# Patient Record
(demographics unavailable — no encounter records)

---

## 2024-10-09 NOTE — PHYSICAL EXAM
[Restricted in physically strenuous activity but ambulatory and able to carry out work of a light or sedentary nature] : Status 1- Restricted in physically strenuous activity but ambulatory and able to carry out work of a light or sedentary nature, e.g., light house work, office work [Normal] : affect appropriate [de-identified] : No icterus [de-identified] : KENDRA  [de-identified] : Somewhat distant BS [de-identified] : Supple No LAD  [de-identified] : S1 S2 [de-identified] : Mild LE edema [de-identified] : No spine/CVA tenderness [de-identified] : Ambulates with cane

## 2024-10-09 NOTE — RESULTS/DATA
[FreeTextEntry1] : - PET/CT 5/11/2022: FDG avid nodular pleural thickening in the left lung base concerning for metastatic disease.  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  Additional 2-3 mm nodules in the right lung.  Persistent focal hypermetabolic activity in the anus.  - CT chest 8/10/2022: Overall findings are worrisome for worsening metastatic disease in both lungs and pleura.  -CT Chest 11/4/22:  Since 8/10/2022:  Unchanged irregular and nodular interlobular septal thickening in the posterior aspect of the left upper lobe and bilateral sub-5 mm lung nodules (and new compared to 2/20/2022).  Increased size of multiple skeletal sclerotic lesions.  -MRI Brain 11/6/22:  No acute intracranial hemorrhage, acute infarction, extra-axial fluid collection or hydrocephalus.  No enhancing intracranial lesion identified to suggest intracranial metastases.    --CT Chest 2/3/23: Decreased small loculated left pleural effusion with resolution of left pleural gas. Numerous stable sub-4 mm nodules in both lungs. No new or enlarging lung nodule. Increasing sclerosis of numerous bone metastasis.  -CT Chest 4/28/23:  In comparison with 2/3/2023, new nodular groundglass opacities throughout the right lung.  Stable micronodules along the pleural and fissural surfaces.  Left lower lobectomy with stable postsurgical changes. Stable septal thickening throughout the left upper lobe and right lower lobe. Stable left pleural thickening. Stable trace left pleural effusion.  Stable metastatic sclerotic bone lesions.  -CT Chest 6/28/23:  Compared to the previous examination, no significant change is noted.  -CT Chest 9/18/23:  Numerous bilateral 1-2 mm pulmonary nodules are unchanged since June 28, 2023.  Stable osseous metastases.  -CT Chest 12/27/23:  Increased 5 mm right perifissural nodule since September 2023. Other numerous small bilateral pulmonary nodules are unchanged.  Osteosclerotic metastases with increased sclerosis of the left anterior fourth rib.  Stable left pleural thickening and lower lobe septal thickening (?lymphangitic carcinomatosis).   -CT Chest 3/27/24: Previously described increased 5 mm right perifissural nodule has resolved, and may had represented area of atelectasis. No new or enlarging pulmonary nodule. Post left lower lobectomy. Osteosclerotic metastases, unchanged.  -CT Chest 6/18/24:  Interval resolution of previously seen posterior right lower lobe peribronchovascular groundglass opacities, which were likely infectious/inflammatory. Otherwise stable exam status post left lower lobectomy with pleural and septal thickening, numerous bilateral small nodules, and osteosclerotic metastases.

## 2024-10-09 NOTE — BEGINNING OF VISIT
[0] : 2) Feeling down, depressed, or hopeless: Not at all (0) [Pain Scale: ___] : On a scale of 1-10, today the patient's pain is a(n) [unfilled]. [Medication(s)] : Medication(s) [Former] : Former [0-4] : 0-4 [> 15 Years] : > 15 Years [Date Discussed (MM/DD/YY): ___] : Discussed: [unfilled] [With Patient/Caregiver] : with Patient/Caregiver

## 2024-10-09 NOTE — HISTORY OF PRESENT ILLNESS
[Disease: _____________________] : Disease: [unfilled] [AJCC Stage: ____] : AJCC Stage: [unfilled] [de-identified] : 65F with metastatic NSCLC with adenocarcinoma histology with activating EGFR mutation.  In 2019, she was having eye surgery for a possible retinal detachment and a choroid lesion was noted with biopsy revealing malignancy.  She was sent to an ophthalmologist/oncologist (Dr. Kendall) who sent the patient for PET/CT in February 2019 revealing evidence of a left lower lobe lung primary tumor with intrathoracic lymph node involvement and evidence of bony metastatic disease.  Biopsy confirmed NSCLC with adenocarcinoma histology.  Peripheral blood CT-DNA testing revealed an activating EGFR mutation (exon 19 deletion).  She started osimertinib in March 2019.  She had transient left arm pain and swelling after starting the medication with negative Dopplers and a mild elevation of CK.  Medication was temporarily dose reduced to 40 mg before resuming full dose without recurrence of symptoms.  She responded to the medication.  Restaging CT in November 2020 revealed disease progression in the LLL primary tumor and a small left effusion.  She underwent VATS left lower lobectomy in December 2020 with pathology revealing a 2.1 cm adenocarcinoma with negative lymph nodes; molecular testing revealed her known EGFR exon 19 deletion mutation.  She continued on osimertinib.  A restaging PET/CT in May 2022 revealed findings concerning for disease progression, particularly in the left lung.  Follow-up CT chest in August 2022 revealed findings concerning for disease progression in the lungs and pleura.  She transferred care to [facility] in Sept 2022.  Peripheral blood ct-DNA testing from Sept 2022 was negative for any resistance mutations and just revealed her known EGFR mutation.   Began 2nd line Carbo/Pemetrexed in September 2022 with overall stable disease.  Completed 4 cycles of platinum doublet chemotherapy in mid November 2022 followed by pemetrexed maintenance as of early December 2022.   [de-identified] : Guardant ct-DNA 9/7/22 with EGFR exon 19 deletion, STK11;APC.  [de-identified] : Completed 4 cycles of platinum doublet chemotherapy in mid-November 2022 followed by pemetrexed maintenance as of early December 2022.  Has had overall stable disease.  She presents today prior to continued planned treatment with maintenance pemetrexed. Feeling well. Offers no new complaints Continues to have occasional lower back pain. Takes Tylenol BID with relief.  Appetite intact.  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

## 2024-10-09 NOTE — PHYSICAL EXAM
[Restricted in physically strenuous activity but ambulatory and able to carry out work of a light or sedentary nature] : Status 1- Restricted in physically strenuous activity but ambulatory and able to carry out work of a light or sedentary nature, e.g., light house work, office work [Normal] : affect appropriate [de-identified] : No icterus [de-identified] : KENDRA  [de-identified] : Somewhat distant BS [de-identified] : Supple No LAD  [de-identified] : S1 S2 [de-identified] : Mild LE edema [de-identified] : No spine/CVA tenderness [de-identified] : Ambulates with cane

## 2024-10-09 NOTE — PHYSICAL EXAM
[Restricted in physically strenuous activity but ambulatory and able to carry out work of a light or sedentary nature] : Status 1- Restricted in physically strenuous activity but ambulatory and able to carry out work of a light or sedentary nature, e.g., light house work, office work [Normal] : affect appropriate [de-identified] : No icterus [de-identified] : KENDRA  [de-identified] : Supple No LAD  [de-identified] : Somewhat distant BS [de-identified] : S1 S2 [de-identified] : Mild LE edema [de-identified] : No spine/CVA tenderness [de-identified] : Ambulates with cane

## 2024-10-09 NOTE — HISTORY OF PRESENT ILLNESS
[Disease: _____________________] : Disease: [unfilled] [AJCC Stage: ____] : AJCC Stage: [unfilled] [de-identified] : 65F with metastatic NSCLC with adenocarcinoma histology with activating EGFR mutation.  In 2019, she was having eye surgery for a possible retinal detachment and a choroid lesion was noted with biopsy revealing malignancy.  She was sent to an ophthalmologist/oncologist (Dr. Kendall) who sent the patient for PET/CT in February 2019 revealing evidence of a left lower lobe lung primary tumor with intrathoracic lymph node involvement and evidence of bony metastatic disease.  Biopsy confirmed NSCLC with adenocarcinoma histology.  Peripheral blood CT-DNA testing revealed an activating EGFR mutation (exon 19 deletion).  She started osimertinib in March 2019.  She had transient left arm pain and swelling after starting the medication with negative Dopplers and a mild elevation of CK.  Medication was temporarily dose reduced to 40 mg before resuming full dose without recurrence of symptoms.  She responded to the medication.  Restaging CT in November 2020 revealed disease progression in the LLL primary tumor and a small left effusion.  She underwent VATS left lower lobectomy in December 2020 with pathology revealing a 2.1 cm adenocarcinoma with negative lymph nodes; molecular testing revealed her known EGFR exon 19 deletion mutation.  She continued on osimertinib.  A restaging PET/CT in May 2022 revealed findings concerning for disease progression, particularly in the left lung.  Follow-up CT chest in August 2022 revealed findings concerning for disease progression in the lungs and pleura.  She transferred care to [facility] in Sept 2022.  Peripheral blood ct-DNA testing from Sept 2022 was negative for any resistance mutations and just revealed her known EGFR mutation.   Began 2nd line Carbo/Pemetrexed in September 2022 with overall stable disease.  Completed 4 cycles of platinum doublet chemotherapy in mid November 2022 followed by pemetrexed maintenance as of early December 2022.   [de-identified] : Guardant ct-DNA 9/7/22 with EGFR exon 19 deletion, STK11;APC.  [de-identified] : Completed 4 cycles of platinum doublet chemotherapy in mid-November 2022 followed by pemetrexed maintenance as of early December 2022.  Has had overall stable disease.  She presents today prior to continued planned treatment with maintenance pemetrexed. Feeling well. Offers no new complaints Continues to have occasional lower back pain. Takes Tylenol BID with relief.  Appetite intact.  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

## 2024-10-09 NOTE — HISTORY OF PRESENT ILLNESS
[Disease: _____________________] : Disease: [unfilled] [AJCC Stage: ____] : AJCC Stage: [unfilled] [de-identified] : 65F with metastatic NSCLC with adenocarcinoma histology with activating EGFR mutation.  In 2019, she was having eye surgery for a possible retinal detachment and a choroid lesion was noted with biopsy revealing malignancy.  She was sent to an ophthalmologist/oncologist (Dr. Kendall) who sent the patient for PET/CT in February 2019 revealing evidence of a left lower lobe lung primary tumor with intrathoracic lymph node involvement and evidence of bony metastatic disease.  Biopsy confirmed NSCLC with adenocarcinoma histology.  Peripheral blood CT-DNA testing revealed an activating EGFR mutation (exon 19 deletion).  She started osimertinib in March 2019.  She had transient left arm pain and swelling after starting the medication with negative Dopplers and a mild elevation of CK.  Medication was temporarily dose reduced to 40 mg before resuming full dose without recurrence of symptoms.  She responded to the medication.  Restaging CT in November 2020 revealed disease progression in the LLL primary tumor and a small left effusion.  She underwent VATS left lower lobectomy in December 2020 with pathology revealing a 2.1 cm adenocarcinoma with negative lymph nodes; molecular testing revealed her known EGFR exon 19 deletion mutation.  She continued on osimertinib.  A restaging PET/CT in May 2022 revealed findings concerning for disease progression, particularly in the left lung.  Follow-up CT chest in August 2022 revealed findings concerning for disease progression in the lungs and pleura.  She transferred care to [facility] in Sept 2022.  Peripheral blood ct-DNA testing from Sept 2022 was negative for any resistance mutations and just revealed her known EGFR mutation.   Began 2nd line Carbo/Pemetrexed in September 2022 with overall stable disease.  Completed 4 cycles of platinum doublet chemotherapy in mid November 2022 followed by pemetrexed maintenance as of early December 2022.   [de-identified] : Guardant ct-DNA 9/7/22 with EGFR exon 19 deletion, STK11;APC.  [de-identified] : Completed 4 cycles of platinum doublet chemotherapy in mid-November 2022 followed by pemetrexed maintenance as of early December 2022.  Has had overall stable disease.  She presents today prior to continued planned treatment with maintenance pemetrexed. Feeling well. Offers no new complaints Continues to have occasional lower back pain. Takes Tylenol BID with relief.  Appetite intact.  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

## 2024-10-09 NOTE — ASSESSMENT
[FreeTextEntry1] : NSCLC with adenocarcinoma histology with activating EGFR mutation diagnosed in early 2019 treated with first-line osimertinib in March 2019. Developed oligometastatic disease progression in the primary tumor status post resection with left lower lobectomy in December 2020 and continued on osimertinib. Restaging CT from August 2022 with evidence of disease progression that does not appear amenable to local therapy. Started 2nd line chemotherapy with Carbo/Pemetrexed in Sept 2022 with overall stable disease. Completed 4 cycles of platinum doublet chemotherapy in mid November 2022 followed by pemetrexed maintenance as of early December 2022. Restaging CT June 2024 with disease stability.  Recommend: -Continue Pemetrexed maintenance for as long as it benefits the patient -Edema: improved. May utilize furosemide prn. -Repeat labs today; can monitor periodic CEA roughly monthly -Continue daily folic acid, Dex in the johana-chemo setting and Vit B12 administered q3 cycles. -Anemia appears chemo induced. Monitor hemoglobin. Monitor periodic iron studies. Previously declined blood transfusion in past when hemoglobin was lower.  - She declined addition of a bone modifying medication secondary to the risk of ONJ. - Back pain is possibly neoplasm related and has been mostly controlled with Tylenol.  Discussed potential use of NSAIDs such as ibuprofen/Advil or taking an extra dose of dexamethasone if she experienced a transient increase in pain. -Prior rash currently resolved: appears related to pemetrexed and is steroid responsive - Follow-up prior next visit or sooner should problems arise.   -restaging scan prior to her early October visit. Order has been placed for non-contrast CT chest. Pt will complete prior to next appointment with Dr. Leon.  [Medication(s)] : Medication(s)

## 2024-11-13 NOTE — HISTORY OF PRESENT ILLNESS
[Disease: _____________________] : Disease: [unfilled] [AJCC Stage: ____] : AJCC Stage: [unfilled] [de-identified] : 68F with metastatic NSCLC with adenocarcinoma histology with activating EGFR mutation.  In 2019, she was having eye surgery for a possible retinal detachment and a choroid lesion was noted with biopsy revealing malignancy.  She was sent to an ophthalmologist/oncologist (Dr. Kendall) who sent the patient for PET/CT in February 2019 revealing evidence of a left lower lobe lung primary tumor with intrathoracic lymph node involvement and evidence of bony metastatic disease.  Biopsy confirmed NSCLC with adenocarcinoma histology.  Peripheral blood CT-DNA testing revealed an activating EGFR mutation (exon 19 deletion).  She started osimertinib in March 2019.  She had transient left arm pain and swelling after starting the medication with negative Dopplers and a mild elevation of CK.  Medication was temporarily dose reduced to 40 mg before resuming full dose without recurrence of symptoms.  She responded to the medication.  Restaging CT in November 2020 revealed disease progression in the LLL primary tumor and a small left effusion.  She underwent VATS left lower lobectomy in December 2020 with pathology revealing a 2.1 cm adenocarcinoma with negative lymph nodes; molecular testing revealed her known EGFR exon 19 deletion mutation.  She continued on osimertinib.  A restaging PET/CT in May 2022 revealed findings concerning for disease progression, particularly in the left lung.  Follow-up CT chest in August 2022 revealed findings concerning for disease progression in the lungs and pleura.  She transferred care to [facility] in Sept 2022.  Peripheral blood ct-DNA testing from Sept 2022 was negative for any resistance mutations and just revealed her known EGFR mutation.   Began 2nd line Carbo/Pemetrexed in September 2022 with overall stable disease.  Completed 4 cycles of platinum doublet chemotherapy in mid November 2022 followed by pemetrexed maintenance as of early December 2022.  Developed disease progression in October 2024.  Therapy with amivantamab deferred due to concerns about ability to work throughout treatment. She started third-line gemcitabine on 11/7/24. [de-identified] : Guardant ct-DNA 9/7/22 with EGFR exon 19 deletion, STK11;APC.  [de-identified] : Mr. Gould started third-line gemcitabine on 11/7/24. She reports pain to the arm during infusion that improved with saline running with it.  She denies any vein irritation after infusion. Reports generalized swelling after treatment (arms, legs, abdomen) that got better with time. States some ankle swelling remains. No weight gain noted.  Felt insomnia the night after treatment. Had mild headache. Taste change. Mildly fatigue not affecting usual activity. Reports good appetite.  Denies fever, chills, cough, dyspnea, nausea, vomiting, constipation, or diarrhea.

## 2024-11-13 NOTE — PHYSICAL EXAM
[Restricted in physically strenuous activity but ambulatory and able to carry out work of a light or sedentary nature] : Status 1- Restricted in physically strenuous activity but ambulatory and able to carry out work of a light or sedentary nature, e.g., light house work, office work [Normal] : affect appropriate [de-identified] : No icterus [de-identified] : KENDRA  [de-identified] : Supple No LAD  [de-identified] : Somewhat distant BS [de-identified] : S1 S2 [de-identified] : Trace pitting edema bilateral ankles/feet [de-identified] : Soft, nontender.  [de-identified] : No spine/CVA tenderness [de-identified] : Ambulates with cane [de-identified] : No rash or skin lesions noted.

## 2024-11-13 NOTE — PHYSICAL EXAM
[Restricted in physically strenuous activity but ambulatory and able to carry out work of a light or sedentary nature] : Status 1- Restricted in physically strenuous activity but ambulatory and able to carry out work of a light or sedentary nature, e.g., light house work, office work [Normal] : affect appropriate [de-identified] : No icterus [de-identified] : KENDRA  [de-identified] : Supple No LAD  [de-identified] : Somewhat distant BS [de-identified] : S1 S2 [de-identified] : Trace pitting edema bilateral ankles/feet [de-identified] : Soft, nontender.  [de-identified] : No spine/CVA tenderness [de-identified] : Ambulates with cane [de-identified] : No rash or skin lesions noted.

## 2024-11-13 NOTE — HISTORY OF PRESENT ILLNESS
[Disease: _____________________] : Disease: [unfilled] [AJCC Stage: ____] : AJCC Stage: [unfilled] [de-identified] : 68F with metastatic NSCLC with adenocarcinoma histology with activating EGFR mutation.  In 2019, she was having eye surgery for a possible retinal detachment and a choroid lesion was noted with biopsy revealing malignancy.  She was sent to an ophthalmologist/oncologist (Dr. Kendall) who sent the patient for PET/CT in February 2019 revealing evidence of a left lower lobe lung primary tumor with intrathoracic lymph node involvement and evidence of bony metastatic disease.  Biopsy confirmed NSCLC with adenocarcinoma histology.  Peripheral blood CT-DNA testing revealed an activating EGFR mutation (exon 19 deletion).  She started osimertinib in March 2019.  She had transient left arm pain and swelling after starting the medication with negative Dopplers and a mild elevation of CK.  Medication was temporarily dose reduced to 40 mg before resuming full dose without recurrence of symptoms.  She responded to the medication.  Restaging CT in November 2020 revealed disease progression in the LLL primary tumor and a small left effusion.  She underwent VATS left lower lobectomy in December 2020 with pathology revealing a 2.1 cm adenocarcinoma with negative lymph nodes; molecular testing revealed her known EGFR exon 19 deletion mutation.  She continued on osimertinib.  A restaging PET/CT in May 2022 revealed findings concerning for disease progression, particularly in the left lung.  Follow-up CT chest in August 2022 revealed findings concerning for disease progression in the lungs and pleura.  She transferred care to [facility] in Sept 2022.  Peripheral blood ct-DNA testing from Sept 2022 was negative for any resistance mutations and just revealed her known EGFR mutation.   Began 2nd line Carbo/Pemetrexed in September 2022 with overall stable disease.  Completed 4 cycles of platinum doublet chemotherapy in mid November 2022 followed by pemetrexed maintenance as of early December 2022.  Developed disease progression in October 2024.  Therapy with amivantamab deferred due to concerns about ability to work throughout treatment. She started third-line gemcitabine on 11/7/24. [de-identified] : Guardant ct-DNA 9/7/22 with EGFR exon 19 deletion, STK11;APC.  [de-identified] : Mr. Gould started third-line gemcitabine on 11/7/24. She reports pain to the arm during infusion that improved with saline running with it.  She denies any vein irritation after infusion. Reports generalized swelling after treatment (arms, legs, abdomen) that got better with time. States some ankle swelling remains. No weight gain noted.  Felt insomnia the night after treatment. Had mild headache. Taste change. Mildly fatigue not affecting usual activity. Reports good appetite.  Denies fever, chills, cough, dyspnea, nausea, vomiting, constipation, or diarrhea.

## 2024-11-13 NOTE — PHYSICAL EXAM
[Restricted in physically strenuous activity but ambulatory and able to carry out work of a light or sedentary nature] : Status 1- Restricted in physically strenuous activity but ambulatory and able to carry out work of a light or sedentary nature, e.g., light house work, office work [Normal] : affect appropriate [de-identified] : No icterus [de-identified] : KENDRA  [de-identified] : Supple No LAD  [de-identified] : Somewhat distant BS [de-identified] : S1 S2 [de-identified] : Trace pitting edema bilateral ankles/feet [de-identified] : Soft, nontender.  [de-identified] : No spine/CVA tenderness [de-identified] : Ambulates with cane [de-identified] : No rash or skin lesions noted.

## 2024-11-13 NOTE — RESULTS/DATA
[FreeTextEntry1] : - PET/CT 5/11/2022: FDG avid nodular pleural thickening in the left lung base concerning for metastatic disease.  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  Additional 2-3 mm nodules in the right lung.  Persistent focal hypermetabolic activity in the anus.  - CT chest 8/10/2022: Overall findings are worrisome for worsening metastatic disease in both lungs and pleura.  -CT Chest 11/4/22:  Since 8/10/2022:  Unchanged irregular and nodular interlobular septal thickening in the posterior aspect of the left upper lobe and bilateral sub-5 mm lung nodules (and new compared to 2/20/2022).  Increased size of multiple skeletal sclerotic lesions.  -MRI Brain 11/6/22:  No acute intracranial hemorrhage, acute infarction, extra-axial fluid collection or hydrocephalus.  No enhancing intracranial lesion identified to suggest intracranial metastases.    --CT Chest 2/3/23: Decreased small loculated left pleural effusion with resolution of left pleural gas. Numerous stable sub-4 mm nodules in both lungs. No new or enlarging lung nodule. Increasing sclerosis of numerous bone metastasis.  -CT Chest 4/28/23:  In comparison with 2/3/2023, new nodular groundglass opacities throughout the right lung.  Stable micronodules along the pleural and fissural surfaces.  Left lower lobectomy with stable postsurgical changes. Stable septal thickening throughout the left upper lobe and right lower lobe. Stable left pleural thickening. Stable trace left pleural effusion.  Stable metastatic sclerotic bone lesions.  -CT Chest 6/28/23:  Compared to the previous examination, no significant change is noted.  -CT Chest 9/18/23:  Numerous bilateral 1-2 mm pulmonary nodules are unchanged since June 28, 2023.  Stable osseous metastases.  -CT Chest 12/27/23:  Increased 5 mm right perifissural nodule since September 2023. Other numerous small bilateral pulmonary nodules are unchanged.  Osteosclerotic metastases with increased sclerosis of the left anterior fourth rib.  Stable left pleural thickening and lower lobe septal thickening (?lymphangitic carcinomatosis).   -CT Chest 3/27/24: Previously described increased 5 mm right perifissural nodule has resolved, and may had represented area of atelectasis. No new or enlarging pulmonary nodule. Post left lower lobectomy. Osteosclerotic metastases, unchanged.  -CT Chest 6/18/24:  Interval resolution of previously seen posterior right lower lobe peribronchovascular groundglass opacities, which were likely infectious/inflammatory. Otherwise stable exam status post left lower lobectomy with pleural and septal thickening, numerous bilateral small nodules, and osteosclerotic metastases.  -CT chest 10/2/24: A few right mid to lower lung ill-defined groundglass nodular opacity opacities new since June 18, 2024. Differential diagnostic considerations include infectious or inflammatory etiologies. A 1-3 month follow-up noncontrast chest CT is recommended for further evaluation. Bilateral pulmonary nodules appear unchanged. Bony metastatic disease.  Images Reviewed/Interpreted:  -PET/CT 10/23/24:  Abnormal skull-to-thigh FDG-PET/CT scan. 1. Nonspecific mildly FDG-avid patchy opacity in the right lower lobe is increased since 10/2/2024, and is new as compared to prior PET/CT dated 5/11/2022. Correlate clinically for infection. A 1 month follow-up with dedicated CT of chest may be obtained for further characterization. 2. Multiple FDG-avid sclerotic and difficult to delineate osseous metastases in the axial and appendicular skeleton are new as compared to prior PET/CT. 3. A moderate FDG-avid mid thoracic compression fracture/deformity is similar in appearance as compared to recent CT and demonstrates increased loss of height and new FDG avidity as compared to prior PET/CT.

## 2024-11-13 NOTE — HISTORY OF PRESENT ILLNESS
[Disease: _____________________] : Disease: [unfilled] [AJCC Stage: ____] : AJCC Stage: [unfilled] [de-identified] : 68F with metastatic NSCLC with adenocarcinoma histology with activating EGFR mutation.  In 2019, she was having eye surgery for a possible retinal detachment and a choroid lesion was noted with biopsy revealing malignancy.  She was sent to an ophthalmologist/oncologist (Dr. Kendall) who sent the patient for PET/CT in February 2019 revealing evidence of a left lower lobe lung primary tumor with intrathoracic lymph node involvement and evidence of bony metastatic disease.  Biopsy confirmed NSCLC with adenocarcinoma histology.  Peripheral blood CT-DNA testing revealed an activating EGFR mutation (exon 19 deletion).  She started osimertinib in March 2019.  She had transient left arm pain and swelling after starting the medication with negative Dopplers and a mild elevation of CK.  Medication was temporarily dose reduced to 40 mg before resuming full dose without recurrence of symptoms.  She responded to the medication.  Restaging CT in November 2020 revealed disease progression in the LLL primary tumor and a small left effusion.  She underwent VATS left lower lobectomy in December 2020 with pathology revealing a 2.1 cm adenocarcinoma with negative lymph nodes; molecular testing revealed her known EGFR exon 19 deletion mutation.  She continued on osimertinib.  A restaging PET/CT in May 2022 revealed findings concerning for disease progression, particularly in the left lung.  Follow-up CT chest in August 2022 revealed findings concerning for disease progression in the lungs and pleura.  She transferred care to [facility] in Sept 2022.  Peripheral blood ct-DNA testing from Sept 2022 was negative for any resistance mutations and just revealed her known EGFR mutation.   Began 2nd line Carbo/Pemetrexed in September 2022 with overall stable disease.  Completed 4 cycles of platinum doublet chemotherapy in mid November 2022 followed by pemetrexed maintenance as of early December 2022.  Developed disease progression in October 2024.  Therapy with amivantamab deferred due to concerns about ability to work throughout treatment. She started third-line gemcitabine on 11/7/24. [de-identified] : Guardant ct-DNA 9/7/22 with EGFR exon 19 deletion, STK11;APC.  [de-identified] : Mr. Gould started third-line gemcitabine on 11/7/24. She reports pain to the arm during infusion that improved with saline running with it.  She denies any vein irritation after infusion. Reports generalized swelling after treatment (arms, legs, abdomen) that got better with time. States some ankle swelling remains. No weight gain noted.  Felt insomnia the night after treatment. Had mild headache. Taste change. Mildly fatigue not affecting usual activity. Reports good appetite.  Denies fever, chills, cough, dyspnea, nausea, vomiting, constipation, or diarrhea.

## 2024-11-13 NOTE — ASSESSMENT
[FreeTextEntry1] : NSCLC with adenocarcinoma histology with activating EGFR mutation diagnosed in early 2019 treated with first-line osimertinib in March 2019. Developed oligometastatic disease progression in the primary tumor status post resection with left lower lobectomy in December 2020 and continued on osimertinib. Restaging CT from August 2022 with evidence of disease progression that does not appear amenable to local therapy. Started 2nd line chemotherapy with Carbo/Pemetrexed in Sept 2022 with overall stable disease. Completed 4 cycles of platinum doublet chemotherapy in mid-November 2022 followed by pemetrexed maintenance as of early December 2022. Restaging CT chest October 2024 with new right lung opacities suspected to be inflammatory with evidence of bony metastatic disease with increased sclerosis in the setting of increased back pain.  PET/CT Late Oct 2024 with evidence of disease progression. Started third-line gemcitabine on 11/7/24.  Recommend: - Continue third-line gemcitabine 800mg/m2 IV weekly (3 weeks on, 1 week off). Due for cycle 1 day 8 today.  - Deferring Amivantamab for potential future line of therapy due to patient concerns of infusion time/ability to work.  - Bloodwork today. Can monitor periodic CEA. Elevated CEA of 81.1 on 11/7/24.  - Anemia. Monitor periodic iron studies. Hgb 10 today.  - She declined addition of a bone modifying medication secondary to the risk of ONJ; discussed again at 10/30/24 visit. - Edema. Likely from gemcitabine. Reported as improved currently. No weight gain is noted. Consider dexamethasone johana-chemo for the swelling.  - Neoplasm related pain: Mainly back pain. Utilizing Tylenol 500mg twice daily with relief. Can utilize NSAIDs such as ibuprofen/Advil or dexamethasone.  Lidocaine patches were not covered by insurance.  - Follow-up on day 1 of each cycle going forward.  - Plan to obtain a restaging scan after 2 full cycles (prior to C3) to assess response.

## 2024-12-04 NOTE — PHYSICAL EXAM
[Restricted in physically strenuous activity but ambulatory and able to carry out work of a light or sedentary nature] : Status 1- Restricted in physically strenuous activity but ambulatory and able to carry out work of a light or sedentary nature, e.g., light house work, office work [Normal] : affect appropriate [de-identified] : No icterus [de-identified] : MMM. O/P clear.  [de-identified] : Supple No LAD  [de-identified] : Somewhat diminished/distant breath sounds to the left posteriorly. Otherwise CTA.  [de-identified] : S1 S2 [de-identified] : Trace pitting edema bilateral ankles/feet. No face, neck, or arm swelling.  [de-identified] : Soft, NT, ND [de-identified] : No spine/CVA tenderness [de-identified] : Ambulates with cane [de-identified] : No rash or skin lesions noted.

## 2024-12-04 NOTE — ASSESSMENT
[FreeTextEntry1] : 68F with NSCLC adenocarcinoma histology with activating EGFR mutation diagnosed in early 2019 treated with first-line osimertinib in March 2019. Developed oligometastatic disease progression in the primary tumor status post resection with left lower lobectomy in December 2020 and continued on osimertinib. Restaging CT from August 2022 with evidence of disease progression that does not appear amenable to local therapy. Started 2nd line chemotherapy with Carbo/Pemetrexed in Sept 2022 with overall stable disease. Completed 4 cycles of platinum doublet chemotherapy in mid-November 2022 followed by pemetrexed maintenance as of early December 2022. Restaging CT chest October 2024 with new right lung opacities suspected to be inflammatory with evidence of bony metastatic disease with increased sclerosis in the setting of increased back pain.  PET/CT Late Oct 2024 with evidence of disease progression. Started third-line gemcitabine on 11/7/24.  Recommend: - Continue third-line gemcitabine 800mg/m2 IV weekly (3 weeks on, 1 week off). Due for cycle 2 day 1 gemcitabine today.  - Deferring Amivantamab for potential future line of therapy due to patient concerns of infusion time/ability to work.  - Headache: Reports new/more frequent. No neurological changes. Requested brain MRI to r/o intracranial metastases. Requisition provided. Advised to schedule it to be done soon. - Bloodwork today. Can monitor periodic CEA. Elevated CEA of 81.1 on 11/7/24.  - Anemia. Monitor periodic iron studies.  - Continues to decline addition of a bone modifying medication to decrease risk for skeletal related event.  - Bilateral ankle/foot edema. Mild currently. Likely from gemcitabine. Continue leg elevation. Continue dexamethasone 4mg by mouth daily x 3 days after each gemcitabine infusion which pt states helps.  - Generalized musculoskeletal aches/pains. May be exacerbated by current therapy. Continue acetaminophen PRN which patient states is adequate.  - Discussed mediport placement as infusion RN states veins difficult to find for peripheral access. Pt declines mediport stating risk for infection. States she will think about it and let us know if she changes her mind.  - Requested PET CT to be done 12/23 (after completed 2nd cycle of gemcitabine) scan results to be reviewed at 12/31 follow up visit. Scan requisition was provided. Pt will make appt.  - Continue follow-up on day 1 of each cycle

## 2024-12-04 NOTE — HISTORY OF PRESENT ILLNESS
[Disease: _____________________] : Disease: [unfilled] [AJCC Stage: ____] : AJCC Stage: [unfilled] [de-identified] : 68F with metastatic NSCLC with adenocarcinoma histology with activating EGFR mutation.  In 2019, she was having eye surgery for a possible retinal detachment and a choroid lesion was noted with biopsy revealing malignancy.  She was sent to an ophthalmologist/oncologist (Dr. Kendall) who sent the patient for PET/CT in February 2019 revealing evidence of a left lower lobe lung primary tumor with intrathoracic lymph node involvement and evidence of bony metastatic disease.  Biopsy confirmed NSCLC with adenocarcinoma histology.  Peripheral blood CT-DNA testing revealed an activating EGFR mutation (exon 19 deletion).  She started osimertinib in March 2019.  She had transient left arm pain and swelling after starting the medication with negative Dopplers and a mild elevation of CK.  Medication was temporarily dose reduced to 40 mg before resuming full dose without recurrence of symptoms.  She responded to the medication.  Restaging CT in November 2020 revealed disease progression in the LLL primary tumor and a small left effusion.  She underwent VATS left lower lobectomy in December 2020 with pathology revealing a 2.1 cm adenocarcinoma with negative lymph nodes; molecular testing revealed her known EGFR exon 19 deletion mutation.  She continued on osimertinib.  A restaging PET/CT in May 2022 revealed findings concerning for disease progression, particularly in the left lung.  Follow-up CT chest in August 2022 revealed findings concerning for disease progression in the lungs and pleura.  She transferred care to [facility] in Sept 2022.  Peripheral blood ct-DNA testing from Sept 2022 was negative for any resistance mutations and just revealed her known EGFR mutation.   Began 2nd line Carbo/Pemetrexed in September 2022 with overall stable disease.  Completed 4 cycles of platinum doublet chemotherapy in mid November 2022 followed by pemetrexed maintenance as of early December 2022.  Developed disease progression in October 2024.  Therapy with amivantamab deferred due to concerns about ability to work throughout treatment. She started third-line gemcitabine on 11/7/24. [de-identified] : Guardant ct-DNA 9/7/22 with EGFR exon 19 deletion, STK11;APC.  [de-identified] : Mr. Gould started third-line gemcitabine on 11/7/24 (3 weeks on, 1 week off).  Reports generalized muscle aches and pains which is more than her pre-existing baseline, which she attributes to chemotherapy. She feels comfortable on extra strength Tylenol, 1 tab twice a day.  She reports decreased energy level/ mild dyspnea with overexertion.  States there is always some degree of swelling, to the ankles/feet, and sometimes she feels it is generalized (face, arms). She elevates the legs and also takes dexamethasone 4mg by mouth daily for 3 days after each gemcitabine infusion which she thinks helps.  She reports headaches recently, on and off. States yesterday it lasted the whole day. Doesn't affect balance, vision, or strength.  Denies fever, chills, cough, dyspnea with light activity, nausea, vomiting, diarrhea, or constipation.

## 2024-12-04 NOTE — RESULTS/DATA
[FreeTextEntry1] : - PET/CT 5/11/2022: FDG avid nodular pleural thickening in the left lung base concerning for metastatic disease.  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  Additional 2-3 mm nodules in the right lung.  Persistent focal hypermetabolic activity in the anus.  - CT chest 8/10/2022: Overall findings are worrisome for worsening metastatic disease in both lungs and pleura.  -CT Chest 11/4/22:  Since 8/10/2022:  Unchanged irregular and nodular interlobular septal thickening in the posterior aspect of the left upper lobe and bilateral sub-5 mm lung nodules (and new compared to 2/20/2022).  Increased size of multiple skeletal sclerotic lesions.  -MRI Brain 11/6/22:  No acute intracranial hemorrhage, acute infarction, extra-axial fluid collection or hydrocephalus.  No enhancing intracranial lesion identified to suggest intracranial metastases.    --CT Chest 2/3/23: Decreased small loculated left pleural effusion with resolution of left pleural gas. Numerous stable sub-4 mm nodules in both lungs. No new or enlarging lung nodule. Increasing sclerosis of numerous bone metastasis.  -CT Chest 4/28/23:  In comparison with 2/3/2023, new nodular groundglass opacities throughout the right lung.  Stable micronodules along the pleural and fissural surfaces.  Left lower lobectomy with stable postsurgical changes. Stable septal thickening throughout the left upper lobe and right lower lobe. Stable left pleural thickening. Stable trace left pleural effusion.  Stable metastatic sclerotic bone lesions.  -CT Chest 6/28/23:  Compared to the previous examination, no significant change is noted.  -CT Chest 9/18/23:  Numerous bilateral 1-2 mm pulmonary nodules are unchanged since June 28, 2023.  Stable osseous metastases.  -CT Chest 12/27/23:  Increased 5 mm right perifissural nodule since September 2023. Other numerous small bilateral pulmonary nodules are unchanged.  Osteosclerotic metastases with increased sclerosis of the left anterior fourth rib.  Stable left pleural thickening and lower lobe septal thickening (?lymphangitic carcinomatosis).   -CT Chest 3/27/24: Previously described increased 5 mm right perifissural nodule has resolved, and may had represented area of atelectasis. No new or enlarging pulmonary nodule. Post left lower lobectomy. Osteosclerotic metastases, unchanged.  -CT Chest 6/18/24:  Interval resolution of previously seen posterior right lower lobe peribronchovascular groundglass opacities, which were likely infectious/inflammatory. Otherwise stable exam status post left lower lobectomy with pleural and septal thickening, numerous bilateral small nodules, and osteosclerotic metastases.  -CT chest 10/2/24: A few right mid to lower lung ill-defined groundglass nodular opacity opacities new since June 18, 2024. Differential diagnostic considerations include infectious or inflammatory etiologies. A 1-3 month follow-up noncontrast chest CT is recommended for further evaluation. Bilateral pulmonary nodules appear unchanged. Bony metastatic disease.  -PET/CT 10/23/24:  Abnormal skull-to-thigh FDG-PET/CT scan. 1. Nonspecific mildly FDG-avid patchy opacity in the right lower lobe is increased since 10/2/2024, and is new as compared to prior PET/CT dated 5/11/2022. Correlate clinically for infection. A 1 month follow-up with dedicated CT of chest may be obtained for further characterization. 2. Multiple FDG-avid sclerotic and difficult to delineate osseous metastases in the axial and appendicular skeleton are new as compared to prior PET/CT. 3. A moderate FDG-avid mid thoracic compression fracture/deformity is similar in appearance as compared to recent CT and demonstrates increased loss of height and new FDG avidity as compared to prior PET/CT.

## 2024-12-31 NOTE — ASSESSMENT
[FreeTextEntry1] : 68F with NSCLC adenocarcinoma histology with activating EGFR mutation diagnosed in early 2019 treated with first-line osimertinib in March 2019. Developed oligometastatic disease progression in the primary tumor status post resection with left lower lobectomy in December 2020 and continued on osimertinib. Restaging CT from August 2022 with evidence of disease progression that does not appear amenable to local therapy. Started 2nd line chemotherapy with Carbo/Pemetrexed in Sept 2022 with overall stable disease. Completed 4 cycles of platinum doublet chemotherapy in mid-November 2022 followed by pemetrexed maintenance as of early December 2022. Restaging CT chest October 2024 with new right lung opacities suspected to be inflammatory with evidence of bony metastatic disease with increased sclerosis in the setting of increased back pain.  PET/CT Late Oct 2024 with evidence of disease progression. Started third-line gemcitabine on 11/7/24; achieved nice TX.   Restaging PET/CT 12/18/24 with nice response to interval therapy.  Recommend: - Continue third-line gemcitabine 800mg/m2 IV weekly (3 weeks on, 1 week off).  Starting C3 today.  - Deferring Amivantamab for potential future line of therapy due to patient concerns of infusion time/ability to work.  -Neoplasm-related pain:  Precribed Tramadol to use PRN.  I-stop reviewed.  (Patient has Oxycodone allergy with pruritus as a reaction - discussed this was likely a side effect and not a true allergy).  Can continue ES-Tylenol and Ibuprofen PRN depending on pain level    -Recommend Orthopedic Oncology evaluation of bony findings, in particular the Left femoral head lesion and vertebral comp fracture.  Request for appt sent  -Recommend she see Rad-Onc in consultation to pursue palliative RT to painful bony metastases that are bothersome.  Request for appt sent - Repeat labs today. Can monitor periodic CEA.  - Anemia likely due to chemotherapy.  Monitor hemoglobin.  - Declined addition of a bone modifying medication to decrease risk for skeletal related event.  - Bilateral ankle/foot edema. Mild currently. Likely from gemcitabine. Continue leg elevation. Utilizing dexamethasone 4mg by mouth daily x 3 days after each gemcitabine infusion which pt states helps.  - Previously discussed mediport placement as infusion RN states veins difficult to find for peripheral access.  - Continue follow-up on day 1 of each cycle.  *Plan to obtain her next restaging scan prior to her Late March appt with MD* Checkout form provided to schedule through March.

## 2024-12-31 NOTE — RESULTS/DATA
[FreeTextEntry1] : - PET/CT 5/11/2022: FDG avid nodular pleural thickening in the left lung base concerning for metastatic disease.  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  Additional 2-3 mm nodules in the right lung.  Persistent focal hypermetabolic activity in the anus.  - CT chest 8/10/2022: Overall findings are worrisome for worsening metastatic disease in both lungs and pleura.  -CT Chest 11/4/22:  Since 8/10/2022:  Unchanged irregular and nodular interlobular septal thickening in the posterior aspect of the left upper lobe and bilateral sub-5 mm lung nodules (and new compared to 2/20/2022).  Increased size of multiple skeletal sclerotic lesions.  -MRI Brain 11/6/22:  No acute intracranial hemorrhage, acute infarction, extra-axial fluid collection or hydrocephalus.  No enhancing intracranial lesion identified to suggest intracranial metastases.    --CT Chest 2/3/23: Decreased small loculated left pleural effusion with resolution of left pleural gas. Numerous stable sub-4 mm nodules in both lungs. No new or enlarging lung nodule. Increasing sclerosis of numerous bone metastasis.  -CT Chest 4/28/23:  In comparison with 2/3/2023, new nodular groundglass opacities throughout the right lung.  Stable micronodules along the pleural and fissural surfaces.  Left lower lobectomy with stable postsurgical changes. Stable septal thickening throughout the left upper lobe and right lower lobe. Stable left pleural thickening. Stable trace left pleural effusion.  Stable metastatic sclerotic bone lesions.  -CT Chest 6/28/23:  Compared to the previous examination, no significant change is noted.  -CT Chest 9/18/23:  Numerous bilateral 1-2 mm pulmonary nodules are unchanged since June 28, 2023.  Stable osseous metastases.  -CT Chest 12/27/23:  Increased 5 mm right perifissural nodule since September 2023. Other numerous small bilateral pulmonary nodules are unchanged.  Osteosclerotic metastases with increased sclerosis of the left anterior fourth rib.  Stable left pleural thickening and lower lobe septal thickening (?lymphangitic carcinomatosis).   -CT Chest 3/27/24: Previously described increased 5 mm right perifissural nodule has resolved, and may had represented area of atelectasis. No new or enlarging pulmonary nodule. Post left lower lobectomy. Osteosclerotic metastases, unchanged.  -CT Chest 6/18/24:  Interval resolution of previously seen posterior right lower lobe peribronchovascular groundglass opacities, which were likely infectious/inflammatory. Otherwise stable exam status post left lower lobectomy with pleural and septal thickening, numerous bilateral small nodules, and osteosclerotic metastases.  -CT chest 10/2/24: A few right mid to lower lung ill-defined groundglass nodular opacity opacities new since June 18, 2024. Differential diagnostic considerations include infectious or inflammatory etiologies. A 1-3 month follow-up noncontrast chest CT is recommended for further evaluation. Bilateral pulmonary nodules appear unchanged. Bony metastatic disease.  -PET/CT 10/23/24:  Abnormal skull-to-thigh FDG-PET/CT scan. 1. Nonspecific mildly FDG-avid patchy opacity in the right lower lobe is increased since 10/2/2024, and is new as compared to prior PET/CT dated 5/11/2022. Correlate clinically for infection. A 1 month follow-up with dedicated CT of chest may be obtained for further characterization. 2. Multiple FDG-avid sclerotic and difficult to delineate osseous metastases in the axial and appendicular skeleton are new as compared to prior PET/CT. 3. A moderate FDG-avid mid thoracic compression fracture/deformity is similar in appearance as compared to recent CT and demonstrates increased loss of height and new FDG avidity as compared to prior PET/CT.  Images Reviewed/Interpreted:  -MRI Brain 12/11/24:  No evidence of intracranial metastasis. No acute intracranial hemorrhage or acute infarct. Mild chronic microvascular ischemic changes  - PET/CT 12/18/24:  Compared to FDG-PET/CT scan dated 10/23/2024:  1. Interval response to therapy in numerous FDG avid osseous lesions, which are decreased in FDG avidity. Residual disease is evident.  2. Near resolution of patchy right lower lobe groundglass opacities, with residual right basilar FDG avid groundglass opacity. This likely represents resolving infection.  3. Similar FDG avid left thyroid nodule. Recommend correlation with ultrasound to evaluate for signs of malignancy if not previously performed.  4. FDG avid left femoral head lesion with mild cortical thinning may place patient at risk of future pathologic fracture. No definitive fracture on today's exam.

## 2024-12-31 NOTE — HISTORY OF PRESENT ILLNESS
[Disease: _____________________] : Disease: [unfilled] [AJCC Stage: ____] : AJCC Stage: [unfilled] [de-identified] : 68F with metastatic NSCLC with adenocarcinoma histology with activating EGFR mutation.  In 2019, she was having eye surgery for a possible retinal detachment and a choroid lesion was noted with biopsy revealing malignancy.  She was sent to an ophthalmologist/oncologist (Dr. Kendall) who sent the patient for PET/CT in February 2019 revealing evidence of a left lower lobe lung primary tumor with intrathoracic lymph node involvement and evidence of bony metastatic disease.  Biopsy confirmed NSCLC with adenocarcinoma histology.  Peripheral blood CT-DNA testing revealed an activating EGFR mutation (exon 19 deletion).  She started osimertinib in March 2019.  She had transient left arm pain and swelling after starting the medication with negative Dopplers and a mild elevation of CK.  Medication was temporarily dose reduced to 40 mg before resuming full dose without recurrence of symptoms.  She responded to the medication.  Restaging CT in November 2020 revealed disease progression in the LLL primary tumor and a small left effusion.  She underwent VATS left lower lobectomy in December 2020 with pathology revealing a 2.1 cm adenocarcinoma with negative lymph nodes; molecular testing revealed her known EGFR exon 19 deletion mutation.  She continued on osimertinib.  A restaging PET/CT in May 2022 revealed findings concerning for disease progression, particularly in the left lung.  Follow-up CT chest in August 2022 revealed findings concerning for disease progression in the lungs and pleura.  She transferred care to [facility] in Sept 2022.  Peripheral blood ct-DNA testing from Sept 2022 was negative for any resistance mutations and just revealed her known EGFR mutation.   Began 2nd line Carbo/Pemetrexed in September 2022 with overall stable disease.  Completed 4 cycles of platinum doublet chemotherapy in mid November 2022 followed by pemetrexed maintenance as of early December 2022.  Developed disease progression in October 2024.  Therapy with amivantamab deferred due to concerns about ability to work throughout treatment. She started third-line gemcitabine on 11/7/24; achieved nice [place].   [de-identified] : Guardant ct-DNA 9/7/22 with EGFR exon 19 deletion, STK11;APC.  [de-identified] : Mr. Gould started third-line gemcitabine on 11/7/24 (3 weeks on, 1 week off).  Patient presents prior to planned treatment with start of C3.  Overall reports that she is "not doing well".   She is concerned that she may have fractured a bone in her back.  Reports lifting something heavy on 12/11 or 12/12 and has had significant pain in her left mid-back and right scapula regions since that time.  Taking Tylenol ATC and alternating with Ibuprofen without proper relief.   Otherwise has fatigue and HAWKINS.   Has some LE edema for which she takes Dex 4mg daily x 3 days after chemo infusion.    Restaging PET/CT 12/18/24 with response to interval therapy.

## 2024-12-31 NOTE — PHYSICAL EXAM
[Restricted in physically strenuous activity but ambulatory and able to carry out work of a light or sedentary nature] : Status 1- Restricted in physically strenuous activity but ambulatory and able to carry out work of a light or sedentary nature, e.g., light house work, office work [Normal] : affect appropriate [de-identified] : Soft, NT, ND [de-identified] : No icterus [de-identified] : MMM. O/P clear.  [de-identified] : Supple No LAD  [de-identified] : Somewhat distant BS  [de-identified] : S1 S2 [de-identified] : Trace pitting edema bilateral ankles/feet. [de-identified] : No spine/CVA tenderness [de-identified] : Ambulates with cane [de-identified] : No rash or skin lesions noted.

## 2024-12-31 NOTE — ASSESSMENT
[FreeTextEntry1] : 68F with NSCLC adenocarcinoma histology with activating EGFR mutation diagnosed in early 2019 treated with first-line osimertinib in March 2019. Developed oligometastatic disease progression in the primary tumor status post resection with left lower lobectomy in December 2020 and continued on osimertinib. Restaging CT from August 2022 with evidence of disease progression that does not appear amenable to local therapy. Started 2nd line chemotherapy with Carbo/Pemetrexed in Sept 2022 with overall stable disease. Completed 4 cycles of platinum doublet chemotherapy in mid-November 2022 followed by pemetrexed maintenance as of early December 2022. Restaging CT chest October 2024 with new right lung opacities suspected to be inflammatory with evidence of bony metastatic disease with increased sclerosis in the setting of increased back pain.  PET/CT Late Oct 2024 with evidence of disease progression. Started third-line gemcitabine on 11/7/24; achieved nice [place].   Restaging PET/CT 12/18/24 with nice response to interval therapy.  Recommend: - Continue third-line gemcitabine 800mg/m2 IV weekly (3 weeks on, 1 week off).  Starting C3 today.  - Deferring Amivantamab for potential future line of therapy due to patient concerns of infusion time/ability to work.  -Neoplasm-related pain:  Precribed Tramadol to use PRN.  I-stop reviewed.  (Patient has Oxycodone allergy with pruritus as a reaction - discussed this was likely a side effect and not a true allergy).  Can continue ES-Tylenol and Ibuprofen PRN depending on pain level    -Recommend Orthopedic Oncology evaluation of bony findings, in particular the Left femoral head lesion and vertebral comp fracture.  Request for appt sent  -Recommend she see Rad-Onc in consultation to pursue palliative RT to painful bony metastases that are bothersome.  Request for appt sent - Repeat labs today. Can monitor periodic CEA.  - Anemia likely due to chemotherapy.  Monitor hemoglobin.  - Declined addition of a bone modifying medication to decrease risk for skeletal related event.  - Bilateral ankle/foot edema. Mild currently. Likely from gemcitabine. Continue leg elevation. Utilizing dexamethasone 4mg by mouth daily x 3 days after each gemcitabine infusion which pt states helps.  - Previously discussed mediport placement as infusion RN states veins difficult to find for peripheral access.  - Continue follow-up on day 1 of each cycle.  *Plan to obtain her next restaging scan prior to her Late March appt with MD* Checkout form provided to schedule through March.

## 2024-12-31 NOTE — HISTORY OF PRESENT ILLNESS
[Disease: _____________________] : Disease: [unfilled] [AJCC Stage: ____] : AJCC Stage: [unfilled] [de-identified] : 68F with metastatic NSCLC with adenocarcinoma histology with activating EGFR mutation.  In 2019, she was having eye surgery for a possible retinal detachment and a choroid lesion was noted with biopsy revealing malignancy.  She was sent to an ophthalmologist/oncologist (Dr. Kendall) who sent the patient for PET/CT in February 2019 revealing evidence of a left lower lobe lung primary tumor with intrathoracic lymph node involvement and evidence of bony metastatic disease.  Biopsy confirmed NSCLC with adenocarcinoma histology.  Peripheral blood CT-DNA testing revealed an activating EGFR mutation (exon 19 deletion).  She started osimertinib in March 2019.  She had transient left arm pain and swelling after starting the medication with negative Dopplers and a mild elevation of CK.  Medication was temporarily dose reduced to 40 mg before resuming full dose without recurrence of symptoms.  She responded to the medication.  Restaging CT in November 2020 revealed disease progression in the LLL primary tumor and a small left effusion.  She underwent VATS left lower lobectomy in December 2020 with pathology revealing a 2.1 cm adenocarcinoma with negative lymph nodes; molecular testing revealed her known EGFR exon 19 deletion mutation.  She continued on osimertinib.  A restaging PET/CT in May 2022 revealed findings concerning for disease progression, particularly in the left lung.  Follow-up CT chest in August 2022 revealed findings concerning for disease progression in the lungs and pleura.  She transferred care to [facility] in Sept 2022.  Peripheral blood ct-DNA testing from Sept 2022 was negative for any resistance mutations and just revealed her known EGFR mutation.   Began 2nd line Carbo/Pemetrexed in September 2022 with overall stable disease.  Completed 4 cycles of platinum doublet chemotherapy in mid November 2022 followed by pemetrexed maintenance as of early December 2022.  Developed disease progression in October 2024.  Therapy with amivantamab deferred due to concerns about ability to work throughout treatment. She started third-line gemcitabine on 11/7/24; achieved nice [place].   [de-identified] : Guardant ct-DNA 9/7/22 with EGFR exon 19 deletion, STK11;APC.  [de-identified] : Mr. Gould started third-line gemcitabine on 11/7/24 (3 weeks on, 1 week off).  Patient presents prior to planned treatment with start of C3.  Overall reports that she is "not doing well".   She is concerned that she may have fractured a bone in her back.  Reports lifting something heavy on 12/11 or 12/12 and has had significant pain in her left mid-back and right scapula regions since that time.  Taking Tylenol ATC and alternating with Ibuprofen without proper relief.   Otherwise has fatigue and HAWKINS.   Has some LE edema for which she takes Dex 4mg daily x 3 days after chemo infusion.    Restaging PET/CT 12/18/24 with response to interval therapy.

## 2024-12-31 NOTE — PHYSICAL EXAM
[Restricted in physically strenuous activity but ambulatory and able to carry out work of a light or sedentary nature] : Status 1- Restricted in physically strenuous activity but ambulatory and able to carry out work of a light or sedentary nature, e.g., light house work, office work [Normal] : affect appropriate [de-identified] : Soft, NT, ND [de-identified] : No icterus [de-identified] : MMM. O/P clear.  [de-identified] : Supple No LAD  [de-identified] : Somewhat distant BS  [de-identified] : S1 S2 [de-identified] : Trace pitting edema bilateral ankles/feet. [de-identified] : No spine/CVA tenderness [de-identified] : Ambulates with cane [de-identified] : No rash or skin lesions noted.

## 2025-01-13 NOTE — REVIEW OF SYSTEMS
[Lower Ext Edema] : lower extremity edema [SOB on Exertion] : shortness of breath during exertion [Joint Pain] : joint pain [Easy Bleeding] : a tendency for easy bleeding [Easy Bruising] : a tendency for easy bruising [Negative] : Endocrine [Dysphagia: Grade 0] : Dysphagia: Grade 0 [Esophagitis: Grade 0] : Esophagitis: Grade 0 [Nausea: Grade 0] : Nausea: Grade 0 [Vomiting: Grade 0] : Vomiting: Grade 0 [Edema Limbs: Grade 1-  5 - 10% inter-limb discrepancy in volume or circumference at point of greatest visible difference; swelling or obscuration of anatomic architecture on close inspection] : Edema Limbs: Grade 1-  5 - 10% inter-limb discrepancy in volume or circumference at point of greatest visible difference; swelling or obscuration of anatomic architecture on close inspection [Cough: Grade 0] : Cough: Grade 0 [Dyspnea: Grade 2 - Shortness of breath with minimal exertion; limiting instrumental ADL] : Dyspnea: Grade 2 - Shortness of breath with minimal exertion; limiting instrumental ADL [Hypoxia: Grade 0] : Hypoxia: Grade 0 [Chest Pain] : no chest pain [Palpitations] : no palpitations [Leg Claudication] : no intermittent leg claudication [Shortness Of Breath] : no shortness of breath [Wheezing] : no wheezing [Cough] : no cough [Muscle Pain] : no muscle pain [Muscle Weakness] : no muscle weakness [Gait Disturbance] : no gait disturbance [Swollen Glands] : no swollen glands [FreeTextEntry5] : chemo related edema  [FreeTextEntry1] : chemo related

## 2025-01-13 NOTE — PHYSICAL EXAM
[Normal] : oriented to person, place and time, the affect was normal, the mood was normal and not anxious [de-identified] : pain localized to entire thoracic spine region.

## 2025-01-13 NOTE — VITALS
[Date: ____________] : Patient's last distress assessment performed on [unfilled]. [2 - Distress Level] : Distress Level: 2 [Patient given social work contact information and resource sheet] : Patient was given social work contact information and resource sheet [Maximal Pain Intensity: 4/10] : 4/10 [Least Pain Intensity: 0/10] : 0/10 [OTC] : OTC [80: Normal activity with effort; some signs or symptoms of disease.] : 80: Normal activity with effort; some signs or symptoms of disease.

## 2025-01-13 NOTE — PHYSICAL EXAM
[Normal] : oriented to person, place and time, the affect was normal, the mood was normal and not anxious [de-identified] : pain localized to entire thoracic spine region.

## 2025-01-13 NOTE — HISTORY OF PRESENT ILLNESS
[FreeTextEntry1] : Ms. Kasandra Gould is a 68 year old woman with NSCLC originally diagnosed in 2019 and presents for consultation regarding painful bony metastatic lesions.   She was originally diagnosed in 2019, and treated with Osimertinib. She then had left lower lobectomy in 12/2020. In 2022 her scans revealed continued progression of disease and then started Carbo/Pemetrexed.  October 2024 PET-CT shoed continued POD, she was then started in Gemcitabine 11/7/2024 which she continues currently.   Most recent PET-CT performed on 12/18/2024 IMPRESSION:  Compared to FDG-PET/CT scan dated 10/23/2024: 1.  Interval response to therapy in numerous FDG avid osseous lesions, which are decreased in FDG avidity. Residual disease is evident. 2.  Near resolution of patchy right lower lobe groundglass opacities, with residual right basilar FDG avid groundglass opacity. This likely represents resolving infection. 3.  Similar FDG avid left thyroid nodule. Recommend correlation with ultrasound to evaluate for signs of malignancy if not previously performed. 4.  FDG avid left femoral head lesion with mild cortical thinning may place patient at risk of future pathologic fracture. No definitive fracture on today's exam.  1/13/2025: Patient presents to clinic for consultation in consideration of radiation therapy after being referred by Dr. Leon for palliation of bone pain from metastases.  Since that referral Ms. Gould has been on systemic therapy and reports her pain is improving.  It was 8-10/10, and now is 4/10.  She also saw ortho for the left femur lesion with cortical thinning and surgical stabilization was not recommended.  she was referred to spine team as well for the compression fx but hasn't made the appointment.

## 2025-01-13 NOTE — REASON FOR VISIT
[Consideration of Curative Therapy] : consideration of curative therapy for [Lung Cancer] : lung cancer [Bone Metastasis] : bone metastasis

## 2025-01-31 NOTE — PHYSICAL EXAM
[Restricted in physically strenuous activity but ambulatory and able to carry out work of a light or sedentary nature] : Status 1- Restricted in physically strenuous activity but ambulatory and able to carry out work of a light or sedentary nature, e.g., light house work, office work [Normal] : affect appropriate [de-identified] : MMM. O/P clear.  [de-identified] : No icterus [de-identified] : Supple No LAD  [de-identified] : Somewhat distant BS  [de-identified] : S1 S2 [de-identified] : Trace pitting edema bilateral ankles/feet. [de-identified] : No spine/CVA tenderness [de-identified] : Ambulates with cane [de-identified] : No rash or skin lesions noted.

## 2025-01-31 NOTE — ASSESSMENT
[FreeTextEntry1] : 68F with NSCLC adenocarcinoma histology with activating EGFR mutation diagnosed in early 2019 treated with first-line osimertinib in March 2019. Developed oligometastatic disease progression in the primary tumor status post resection with left lower lobectomy in December 2020 and continued on osimertinib. Restaging CT from August 2022 with evidence of disease progression that does not appear amenable to local therapy. Started 2nd line chemotherapy with Carbo/Pemetrexed in Sept 2022 with overall stable disease. Completed 4 cycles of platinum doublet chemotherapy in mid-November 2022 followed by pemetrexed maintenance as of early December 2022. Restaging CT chest October 2024 with new right lung opacities suspected to be inflammatory with evidence of bony metastatic disease with increased sclerosis in the setting of increased back pain.  PET/CT Late Oct 2024 with evidence of disease progression. Started third-line gemcitabine on 11/7/24; achieved nice [place].   Restaging PET/CT 12/18/24 with nice response to interval therapy.  Recommend: - Continue third-line gemcitabine 800mg/m2 IV weekly (3 weeks on, 1 week off) for as long as it benefits the patient.  Starting C4 today.  - Deferring Amivantamab for potential future line of therapy due to patient concerns of infusion time/ability to work.  -Neoplasm-related pain:  Utilizing Tylenol/ibuprofen currently. Patient will call the office if she needs something stronger; in this scenario, would send tramadol to use as needed to [facility] pharmacy to help sort out coverage issues.  Of note, reports allergy to oxycodone with pruritus as a reaction; this was likely a side effect and not a true allergy. -Saw Orthopedic Oncology evaluation of bony findings:  No surgical intervention recommended -Saw Rad-Onc in consultation for possible palliative RT to painful bony metastases; patient declined RT - Repeat labs today. Can monitor periodic CEA.  - Anemia likely due to chemotherapy.  Monitor hemoglobin.  - Declined addition of a bone modifying medication to decrease risk for skeletal related event.  - Bilateral ankle/foot edema. Mild currently. Likely from gemcitabine. Continue leg elevation. Utilizing dexamethasone 4mg by mouth daily x 3 days after each gemcitabine infusion which pt states helps.  - Previously discussed mediport placement as infusion RN states veins difficult to find for peripheral access.  - Continue follow-up on day 1 of each cycle.  *Plan to obtain her next restaging scan prior to her Late March appt with MD* Patient currently scheduled through March.

## 2025-01-31 NOTE — HISTORY OF PRESENT ILLNESS
[Disease: _____________________] : Disease: [unfilled] [AJCC Stage: ____] : AJCC Stage: [unfilled] [de-identified] : 68F with metastatic NSCLC with adenocarcinoma histology with activating EGFR mutation.  In 2019, she was having eye surgery for a possible retinal detachment and a choroid lesion was noted with biopsy revealing malignancy.  She was sent to an ophthalmologist/oncologist (Dr. Kendall) who sent the patient for PET/CT in February 2019 revealing evidence of a left lower lobe lung primary tumor with intrathoracic lymph node involvement and evidence of bony metastatic disease.  Biopsy confirmed NSCLC with adenocarcinoma histology.  Peripheral blood CT-DNA testing revealed an activating EGFR mutation (exon 19 deletion).  She started osimertinib in March 2019.  She had transient left arm pain and swelling after starting the medication with negative Dopplers and a mild elevation of CK.  Medication was temporarily dose reduced to 40 mg before resuming full dose without recurrence of symptoms.  She responded to the medication.  Restaging CT in November 2020 revealed disease progression in the LLL primary tumor and a small left effusion.  She underwent VATS left lower lobectomy in December 2020 with pathology revealing a 2.1 cm adenocarcinoma with negative lymph nodes; molecular testing revealed her known EGFR exon 19 deletion mutation.  She continued on osimertinib.  A restaging PET/CT in May 2022 revealed findings concerning for disease progression, particularly in the left lung.  Follow-up CT chest in August 2022 revealed findings concerning for disease progression in the lungs and pleura.  She transferred care to [facility] in Sept 2022.  Peripheral blood ct-DNA testing from Sept 2022 was negative for any resistance mutations and just revealed her known EGFR mutation.   Began 2nd line Carbo/Pemetrexed in September 2022 with overall stable disease.  Completed 4 cycles of platinum doublet chemotherapy in mid November 2022 followed by pemetrexed maintenance as of early December 2022.  Developed disease progression in October 2024.  Therapy with amivantamab deferred due to concerns about ability to work throughout treatment. She started third-line gemcitabine on 11/7/24; achieved nice [place].   [de-identified] : Guardant ct-DNA 9/7/22 with EGFR exon 19 deletion, STK11;APC.  [de-identified] : Mr. Gould started third-line gemcitabine on 11/7/24 (3 weeks on, 1 week off); achieved [place].  Patient presents prior to planned treatment with start of C4.  Since her last visit, patient saw ortho-oncology; no surgical intervention was recommended. She saw a radiation oncology and was offered a course of palliative RT; patient declined to pursue RT. She reports that her pain is improved overall. She reports that the tramadol was not covered by her insurance; discussed that we could send this to [facility] to sort out coverage issues, if needed.  Pain is currently being managed utilizing Tylenol and ibuprofen. She has ongoing fatigue and HAWKINS. She has continuous LE edema for which she takes dexamethasone for 3 days after each chemoinfusion.  Edema overall stable.

## 2025-01-31 NOTE — PHYSICAL EXAM
[Restricted in physically strenuous activity but ambulatory and able to carry out work of a light or sedentary nature] : Status 1- Restricted in physically strenuous activity but ambulatory and able to carry out work of a light or sedentary nature, e.g., light house work, office work [Normal] : affect appropriate [de-identified] : No icterus [de-identified] : MMM. O/P clear.  [de-identified] : Supple No LAD  [de-identified] : Somewhat distant BS  [de-identified] : S1 S2 [de-identified] : Trace pitting edema bilateral ankles/feet. [de-identified] : No spine/CVA tenderness [de-identified] : Ambulates with cane [de-identified] : No rash or skin lesions noted.

## 2025-01-31 NOTE — ASSESSMENT
[FreeTextEntry1] : 68F with NSCLC adenocarcinoma histology with activating EGFR mutation diagnosed in early 2019 treated with first-line osimertinib in March 2019. Developed oligometastatic disease progression in the primary tumor status post resection with left lower lobectomy in December 2020 and continued on osimertinib. Restaging CT from August 2022 with evidence of disease progression that does not appear amenable to local therapy. Started 2nd line chemotherapy with Carbo/Pemetrexed in Sept 2022 with overall stable disease. Completed 4 cycles of platinum doublet chemotherapy in mid-November 2022 followed by pemetrexed maintenance as of early December 2022. Restaging CT chest October 2024 with new right lung opacities suspected to be inflammatory with evidence of bony metastatic disease with increased sclerosis in the setting of increased back pain.  PET/CT Late Oct 2024 with evidence of disease progression. Started third-line gemcitabine on 11/7/24; achieved nice [place].   Restaging PET/CT 12/18/24 with nice response to interval therapy.  Recommend: - Continue third-line gemcitabine 800mg/m2 IV weekly (3 weeks on, 1 week off) for as long as it benefits the patient.  Starting C4 today.  - Deferring Amivantamab for potential future line of therapy due to patient concerns of infusion time/ability to work.  -Neoplasm-related pain:  Utilizing Tylenol/ibuprofen currently. Patient will call the office if she needs something stronger; in this scenario, would send tramadol to use as needed to [facility] to help sort out coverage issues.  Of note, reports allergy to oxycodone with pruritus as a reaction; this was likely a side effect and not a true allergy. -Saw Orthopedic Oncology evaluation of bony findings:  No surgical intervention recommended -Saw Rad-Onc in consultation for possible palliative RT to painful bony metastases; patient declined RT - Repeat labs today. Can monitor periodic CEA.  - Anemia likely due to chemotherapy.  Monitor hemoglobin.  - Declined addition of a bone modifying medication to decrease risk for skeletal related event.  - Bilateral ankle/foot edema. Mild currently. Likely from gemcitabine. Continue leg elevation. Utilizing dexamethasone 4mg by mouth daily x 3 days after each gemcitabine infusion which pt states helps.  - Previously discussed mediport placement as infusion RN states veins difficult to find for peripheral access.  - Continue follow-up on day 1 of each cycle.  *Plan to obtain her next restaging scan prior to her Late March appt with MD* Patient currently scheduled through March.

## 2025-01-31 NOTE — HISTORY OF PRESENT ILLNESS
[Disease: _____________________] : Disease: [unfilled] [AJCC Stage: ____] : AJCC Stage: [unfilled] [de-identified] : 68F with metastatic NSCLC with adenocarcinoma histology with activating EGFR mutation.  In 2019, she was having eye surgery for a possible retinal detachment and a choroid lesion was noted with biopsy revealing malignancy.  She was sent to an ophthalmologist/oncologist (Dr. Kendall) who sent the patient for PET/CT in February 2019 revealing evidence of a left lower lobe lung primary tumor with intrathoracic lymph node involvement and evidence of bony metastatic disease.  Biopsy confirmed NSCLC with adenocarcinoma histology.  Peripheral blood CT-DNA testing revealed an activating EGFR mutation (exon 19 deletion).  She started osimertinib in March 2019.  She had transient left arm pain and swelling after starting the medication with negative Dopplers and a mild elevation of CK.  Medication was temporarily dose reduced to 40 mg before resuming full dose without recurrence of symptoms.  She responded to the medication.  Restaging CT in November 2020 revealed disease progression in the LLL primary tumor and a small left effusion.  She underwent VATS left lower lobectomy in December 2020 with pathology revealing a 2.1 cm adenocarcinoma with negative lymph nodes; molecular testing revealed her known EGFR exon 19 deletion mutation.  She continued on osimertinib.  A restaging PET/CT in May 2022 revealed findings concerning for disease progression, particularly in the left lung.  Follow-up CT chest in August 2022 revealed findings concerning for disease progression in the lungs and pleura.  She transferred care to [facility] in Sept 2022.  Peripheral blood ct-DNA testing from Sept 2022 was negative for any resistance mutations and just revealed her known EGFR mutation.   Began 2nd line Carbo/Pemetrexed in September 2022 with overall stable disease.  Completed 4 cycles of platinum doublet chemotherapy in mid November 2022 followed by pemetrexed maintenance as of early December 2022.  Developed disease progression in October 2024.  Therapy with amivantamab deferred due to concerns about ability to work throughout treatment. She started third-line gemcitabine on 11/7/24; achieved nice [place].   [de-identified] : Guardant ct-DNA 9/7/22 with EGFR exon 19 deletion, STK11;APC.  [de-identified] : Mr. Gould started third-line gemcitabine on 11/7/24 (3 weeks on, 1 week off); achieved [place].  Patient presents prior to planned treatment with start of C4.  Since her last visit, patient saw ortho-oncology; no surgical intervention was recommended. She saw a radiation oncology and was offered a course of palliative RT; patient declined to pursue RT. She reports that her pain is improved overall. She reports that the tramadol was not covered by her insurance; discussed that we could send this to [facility] to sort out coverage issues, if needed.  Pain is currently being managed utilizing Tylenol and ibuprofen. She has ongoing fatigue and HAWKINS. She has continuous LE edema for which she takes dexamethasone for 3 days after each chemoinfusion.  Edema overall stable.

## 2025-01-31 NOTE — RESULTS/DATA
[FreeTextEntry1] : - PET/CT 5/11/2022: FDG avid nodular pleural thickening in the left lung base concerning for metastatic disease.  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  Additional 2-3 mm nodules in the right lung.  Persistent focal hypermetabolic activity in the anus.  - CT chest 8/10/2022: Overall findings are worrisome for worsening metastatic disease in both lungs and pleura.  -CT Chest 11/4/22:  Since 8/10/2022:  Unchanged irregular and nodular interlobular septal thickening in the posterior aspect of the left upper lobe and bilateral sub-5 mm lung nodules (and new compared to 2/20/2022).  Increased size of multiple skeletal sclerotic lesions.  -MRI Brain 11/6/22:  No acute intracranial hemorrhage, acute infarction, extra-axial fluid collection or hydrocephalus.  No enhancing intracranial lesion identified to suggest intracranial metastases.    --CT Chest 2/3/23: Decreased small loculated left pleural effusion with resolution of left pleural gas. Numerous stable sub-4 mm nodules in both lungs. No new or enlarging lung nodule. Increasing sclerosis of numerous bone metastasis.  -CT Chest 4/28/23:  In comparison with 2/3/2023, new nodular groundglass opacities throughout the right lung.  Stable micronodules along the pleural and fissural surfaces.  Left lower lobectomy with stable postsurgical changes. Stable septal thickening throughout the left upper lobe and right lower lobe. Stable left pleural thickening. Stable trace left pleural effusion.  Stable metastatic sclerotic bone lesions.  -CT Chest 6/28/23:  Compared to the previous examination, no significant change is noted.  -CT Chest 9/18/23:  Numerous bilateral 1-2 mm pulmonary nodules are unchanged since June 28, 2023.  Stable osseous metastases.  -CT Chest 12/27/23:  Increased 5 mm right perifissural nodule since September 2023. Other numerous small bilateral pulmonary nodules are unchanged.  Osteosclerotic metastases with increased sclerosis of the left anterior fourth rib.  Stable left pleural thickening and lower lobe septal thickening (?lymphangitic carcinomatosis).   -CT Chest 3/27/24: Previously described increased 5 mm right perifissural nodule has resolved, and may had represented area of atelectasis. No new or enlarging pulmonary nodule. Post left lower lobectomy. Osteosclerotic metastases, unchanged.  -CT Chest 6/18/24:  Interval resolution of previously seen posterior right lower lobe peribronchovascular groundglass opacities, which were likely infectious/inflammatory. Otherwise stable exam status post left lower lobectomy with pleural and septal thickening, numerous bilateral small nodules, and osteosclerotic metastases.  -CT chest 10/2/24: A few right mid to lower lung ill-defined groundglass nodular opacity opacities new since June 18, 2024. Differential diagnostic considerations include infectious or inflammatory etiologies. A 1-3 month follow-up noncontrast chest CT is recommended for further evaluation. Bilateral pulmonary nodules appear unchanged. Bony metastatic disease.  -PET/CT 10/23/24:  Abnormal skull-to-thigh FDG-PET/CT scan. 1. Nonspecific mildly FDG-avid patchy opacity in the right lower lobe is increased since 10/2/2024, and is new as compared to prior PET/CT dated 5/11/2022. Correlate clinically for infection. A 1 month follow-up with dedicated CT of chest may be obtained for further characterization. 2. Multiple FDG-avid sclerotic and difficult to delineate osseous metastases in the axial and appendicular skeleton are new as compared to prior PET/CT. 3. A moderate FDG-avid mid thoracic compression fracture/deformity is similar in appearance as compared to recent CT and demonstrates increased loss of height and new FDG avidity as compared to prior PET/CT.  -MRI Brain 12/11/24:  No evidence of intracranial metastasis. No acute intracranial hemorrhage or acute infarct. Mild chronic microvascular ischemic changes  - PET/CT 12/18/24:  Compared to FDG-PET/CT scan dated 10/23/2024:  1. Interval response to therapy in numerous FDG avid osseous lesions, which are decreased in FDG avidity. Residual disease is evident.  2. Near resolution of patchy right lower lobe groundglass opacities, with residual right basilar FDG avid groundglass opacity. This likely represents resolving infection.  3. Similar FDG avid left thyroid nodule. Recommend correlation with ultrasound to evaluate for signs of malignancy if not previously performed.  4. FDG avid left femoral head lesion with mild cortical thinning may place patient at risk of future pathologic fracture. No definitive fracture on today's exam.

## 2025-02-26 NOTE — PHYSICAL EXAM
[de-identified] : No icterus [de-identified] : MMM. O/P clear.  [de-identified] : Supple No LAD  [de-identified] : Decreased over LLL posteriorly, otherwise CTAB [de-identified] : S1 S2 [de-identified] : Trace pitting edema bilateral ankles, right very slightly more than left.  [de-identified] : Soft, nontender, nondistended. BS+ [de-identified] : No spine/CVA tenderness on percussion.  [de-identified] : Ambulates with cane [de-identified] : No rash or skin lesions noted.

## 2025-02-26 NOTE — ASSESSMENT
[FreeTextEntry1] : 68F with NSCLC adenocarcinoma histology with activating EGFR mutation diagnosed in early 2019 treated with first-line osimertinib in March 2019. Developed oligometastatic disease progression in the primary tumor status post resection with left lower lobectomy in December 2020 and continued on osimertinib. Restaging CT from August 2022 with evidence of disease progression that does not appear amenable to local therapy. Started 2nd line chemotherapy with Carbo/Pemetrexed in Sept 2022 with overall stable disease. Completed 4 cycles of platinum doublet chemotherapy in mid-November 2022 followed by pemetrexed maintenance as of early December 2022. Restaging CT chest October 2024 with new right lung opacities suspected to be inflammatory with evidence of bony metastatic disease with increased sclerosis in the setting of increased back pain.  PET/CT Late Oct 2024 with evidence of disease progression. Started third-line gemcitabine on 11/7/24; achieved nice [place].   Restaging PET/CT 12/18/24 with nice response to interval therapy.  Recommend: - Continue third-line gemcitabine 800mg/m2 IV weekly (3 weeks on, 1 week off) for as long as it benefits the patient. Starting Cycle 5 today.  - Deferring Amivantamab for potential future line of therapy due to patient concerns of infusion time/ability to work.  - Neoplasm-related pain: Continue tramadol 50mg by mouth which she is taking once daily. Continue acetaminophen PRN. Of note, reports allergy to oxycodone with pruritus as a reaction; this was likely a side effect and not a true allergy. -Saw Orthopedic Oncology evaluation of bony findings:  No surgical intervention recommended -Saw Rad-Onc in consultation for possible palliative RT to painful bony metastases; patient declined RT - Repeat labs today. Can monitor periodic CEA.  - Anemia likely due to chemotherapy.  Monitor hemoglobin.  - Declined addition of a bone modifying medication to decrease risk for skeletal related event.  - Bilateral ankle/foot edema. Mild currently. Likely from gemcitabine. Continue leg elevation. Utilizing dexamethasone 4mg by mouth daily x 3 days after each gemcitabine infusion which pt states helps. Continue furosemide 20mg by mouth daily. Advised will wait for today's serum chemistries before increasing the furosemide dose, and that she may need potassium replacement if regularly on furosemide.  - Previously discussed mediport placement as infusion RN states veins difficult to find for peripheral access.  - Continue follow-up on day 1 of each cycle.   - Requested restaging PET CT to be done 3/19, to be reviewed at 3/26 office visit with Dr. Leon.  - Checkout form provided with appt through May   [Medication(s)] : Medication(s)

## 2025-02-26 NOTE — HISTORY OF PRESENT ILLNESS
[de-identified] : 68F with metastatic NSCLC with adenocarcinoma histology with activating EGFR mutation.  In 2019, she was having eye surgery for a possible retinal detachment and a choroid lesion was noted with biopsy revealing malignancy.  She was sent to an ophthalmologist/oncologist (Dr. Kendall) who sent the patient for PET/CT in February 2019 revealing evidence of a left lower lobe lung primary tumor with intrathoracic lymph node involvement and evidence of bony metastatic disease.  Biopsy confirmed NSCLC with adenocarcinoma histology.  Peripheral blood CT-DNA testing revealed an activating EGFR mutation (exon 19 deletion).  She started osimertinib in March 2019.  She had transient left arm pain and swelling after starting the medication with negative Dopplers and a mild elevation of CK.  Medication was temporarily dose reduced to 40 mg before resuming full dose without recurrence of symptoms.  She responded to the medication.  Restaging CT in November 2020 revealed disease progression in the LLL primary tumor and a small left effusion.  She underwent VATS left lower lobectomy in December 2020 with pathology revealing a 2.1 cm adenocarcinoma with negative lymph nodes; molecular testing revealed her known EGFR exon 19 deletion mutation.  She continued on osimertinib.  A restaging PET/CT in May 2022 revealed findings concerning for disease progression, particularly in the left lung.  Follow-up CT chest in August 2022 revealed findings concerning for disease progression in the lungs and pleura.  She transferred care to [facility] in Sept 2022.  Peripheral blood ct-DNA testing from Sept 2022 was negative for any resistance mutations and just revealed her known EGFR mutation.   Began 2nd line Carbo/Pemetrexed in September 2022 with overall stable disease.  Completed 4 cycles of platinum doublet chemotherapy in mid November 2022 followed by pemetrexed maintenance as of early December 2022.  Developed disease progression in October 2024.  Therapy with amivantamab deferred due to concerns about ability to work throughout treatment. She started third-line gemcitabine on 11/7/24; achieved nice [place].   [de-identified] : Guardant ct-DNA 9/7/22 with EGFR exon 19 deletion, STK11;APC.  [de-identified] : Mr. Gould started third-line gemcitabine on 11/7/24 (3 weeks on, 1 week off); achieved [place].  Patient presents prior to planned treatment with start of C5.  She reports bothersome side effect of bilateral lower leg edema. This improves with furosemide 20mg by mouth daily, leg elevation, and post-chemo dexamethasone. She would like to increase to furosemide 40mg daily because she feels there is still some swelling of the legs.  She reports decreased activity tolerance with mild dyspnea on exertion. She reports needing to stop after walking 1/4 block due to dyspnea which resolves with brief rest. She feels the diuretic helps with her breathing.  She reports stable pain to the midline upper back for which she takes tramadol 50mg 1 tab per day with relief.  She denies fever, chills, nausea, vomiting, decreased appetite, fatigue, cough, constipation, diarrhea, myalgias, rash, or worsening of edema.

## 2025-03-27 NOTE — RESULTS/DATA
[FreeTextEntry1] : - PET/CT 5/11/2022: FDG avid nodular pleural thickening in the left lung base concerning for metastatic disease.  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  Additional 2-3 mm nodules in the right lung.  Persistent focal hypermetabolic activity in the anus.  - CT chest 8/10/2022: Overall findings are worrisome for worsening metastatic disease in both lungs and pleura.  -CT Chest 11/4/22:  Since 8/10/2022:  Unchanged irregular and nodular interlobular septal thickening in the posterior aspect of the left upper lobe and bilateral sub-5 mm lung nodules (and new compared to 2/20/2022).  Increased size of multiple skeletal sclerotic lesions.  -MRI Brain 11/6/22:  No acute intracranial hemorrhage, acute infarction, extra-axial fluid collection or hydrocephalus.  No enhancing intracranial lesion identified to suggest intracranial metastases.    --CT Chest 2/3/23: Decreased small loculated left pleural effusion with resolution of left pleural gas. Numerous stable sub-4 mm nodules in both lungs. No new or enlarging lung nodule. Increasing sclerosis of numerous bone metastasis.  -CT Chest 4/28/23:  In comparison with 2/3/2023, new nodular groundglass opacities throughout the right lung.  Stable micronodules along the pleural and fissural surfaces.  Left lower lobectomy with stable postsurgical changes. Stable septal thickening throughout the left upper lobe and right lower lobe. Stable left pleural thickening. Stable trace left pleural effusion.  Stable metastatic sclerotic bone lesions.  -CT Chest 6/28/23:  Compared to the previous examination, no significant change is noted.  -CT Chest 9/18/23:  Numerous bilateral 1-2 mm pulmonary nodules are unchanged since June 28, 2023.  Stable osseous metastases.  -CT Chest 12/27/23:  Increased 5 mm right perifissural nodule since September 2023. Other numerous small bilateral pulmonary nodules are unchanged.  Osteosclerotic metastases with increased sclerosis of the left anterior fourth rib.  Stable left pleural thickening and lower lobe septal thickening (?lymphangitic carcinomatosis).   -CT Chest 3/27/24: Previously described increased 5 mm right perifissural nodule has resolved, and may had represented area of atelectasis. No new or enlarging pulmonary nodule. Post left lower lobectomy. Osteosclerotic metastases, unchanged.  -CT Chest 6/18/24:  Interval resolution of previously seen posterior right lower lobe peribronchovascular groundglass opacities, which were likely infectious/inflammatory. Otherwise stable exam status post left lower lobectomy with pleural and septal thickening, numerous bilateral small nodules, and osteosclerotic metastases.  -CT chest 10/2/24: A few right mid to lower lung ill-defined groundglass nodular opacity opacities new since June 18, 2024. Differential diagnostic considerations include infectious or inflammatory etiologies. A 1-3 month follow-up noncontrast chest CT is recommended for further evaluation. Bilateral pulmonary nodules appear unchanged. Bony metastatic disease.  -PET/CT 10/23/24:  Abnormal skull-to-thigh FDG-PET/CT scan. 1. Nonspecific mildly FDG-avid patchy opacity in the right lower lobe is increased since 10/2/2024, and is new as compared to prior PET/CT dated 5/11/2022. Correlate clinically for infection. A 1 month follow-up with dedicated CT of chest may be obtained for further characterization. 2. Multiple FDG-avid sclerotic and difficult to delineate osseous metastases in the axial and appendicular skeleton are new as compared to prior PET/CT. 3. A moderate FDG-avid mid thoracic compression fracture/deformity is similar in appearance as compared to recent CT and demonstrates increased loss of height and new FDG avidity as compared to prior PET/CT.  -MRI Brain 12/11/24:  No evidence of intracranial metastasis. No acute intracranial hemorrhage or acute infarct. Mild chronic microvascular ischemic changes  - PET/CT 12/18/24:  Compared to FDG-PET/CT scan dated 10/23/2024:  1. Interval response to therapy in numerous FDG avid osseous lesions, which are decreased in FDG avidity. Residual disease is evident.  2. Near resolution of patchy right lower lobe groundglass opacities, with residual right basilar FDG avid groundglass opacity. This likely represents resolving infection.  3. Similar FDG avid left thyroid nodule. Recommend correlation with ultrasound to evaluate for signs of malignancy if not previously performed.  4. FDG avid left femoral head lesion with mild cortical thinning may place patient at risk of future pathologic fracture. No definitive fracture on today's exam.  Images Reviewed/Interpreted:  -PET/CT 3/19/25:  Compared to 12/18/2024 FDG PET/CT:  1.Interval continued response to therapy in numerous FDG avid osseous lesions, many which have continued to have further decreased FDG activity. Residual neoplastic disease is still evident. No new FDG avid osseous lesion.  2. Mild right patchy ground glass opacities with residual mild activity similar to prior.  3. Similar FDG avid left thyroid nodule, consider correlation with thyroid ultrasound to evaluate for malignancy if not already performed.  4. Resolution of previously noted hypermetabolism associated with the femoral lesion; this is otherwise stable appearance on CT with mild cortical thinning. This may place patient at risk for future pathologic fracture.  5. New/increased activity within the rectal region, without definite corresponding abnormality on CT. This may be inflammatory and may be assessed clinically. Otherwise no new abnormal FDG uptake elsewhere.

## 2025-03-27 NOTE — ASSESSMENT
[Medication(s)] : Medication(s) [FreeTextEntry1] : 68F with NSCLC adenocarcinoma histology with activating EGFR mutation diagnosed in early 2019 treated with first-line osimertinib in March 2019. Developed oligometastatic disease progression in the primary tumor status post resection with left lower lobectomy in December 2020 and continued on osimertinib. Restaging CT from August 2022 with evidence of disease progression that does not appear amenable to local therapy. Started 2nd line chemotherapy with Carbo/Pemetrexed in Sept 2022 with overall stable disease. Completed 4 cycles of platinum doublet chemotherapy in mid-November 2022 followed by pemetrexed maintenance as of early December 2022. Restaging CT chest October 2024 with new right lung opacities suspected to be inflammatory with evidence of bony metastatic disease with increased sclerosis in the setting of increased back pain.  PET/CT Late Oct 2024 with evidence of disease progression. Started third-line gemcitabine on 11/7/24; achieved nice [place].   Restaging PET/CT 3/19/25 with deeper response.  Recommend: - Continue third-line gemcitabine 800mg/m2 IV weekly (3 weeks on, 1 week off) for as long as it benefits the patient. Starting Cycle 6 today.  - Deferring Amivantamab for potential future line of therapy due to patient concerns of infusion time/ability to work.  - Neoplasm-related pain: Utilizing acetaminophen PRN.  Has tramadol to use if needed which helped in past. Of note, reports allergy to oxycodone with pruritus as a reaction; this was likely a side effect and not a true allergy. -Saw Orthopedic Oncology evaluation of bony findings:  No surgical intervention recommended -Saw Rad-Onc in consultation for possible palliative RT to painful bony metastases; patient declined RT - Repeat labs today. Can monitor periodic CEA.  - Anemia likely due to chemotherapy.  Monitor hemoglobin.  - Thyroid nodule: Can consider thyroid ultrasound for further evaluation.  Patient wishes to hold off for now. - Recommend pulmonary evaluation of ongoing dyspnea.  Deconditioning is likely a component.  Recommendation provided. - Declined addition of a bone modifying medication to decrease risk for skeletal related event.  - Bilateral ankle/foot edema. Mild currently. Likely from gemcitabine. Continue leg elevation. Utilizing dexamethasone 4mg by mouth daily x 3 days after each gemcitabine infusion which pt states helps. Continue furosemide 20mg by mouth daily.   - Previously discussed mediport placement  - Continue follow-up on day 1 of each cycle.   - Plan to obtain her next restaging scan in June prior to the MD visit that month Patient scheduled through June currently

## 2025-03-27 NOTE — PHYSICAL EXAM
[Restricted in physically strenuous activity but ambulatory and able to carry out work of a light or sedentary nature] : Status 1- Restricted in physically strenuous activity but ambulatory and able to carry out work of a light or sedentary nature, e.g., light house work, office work [Normal] : affect appropriate [de-identified] : Soft, nontender, nondistended. BS+ [de-identified] : No icterus [de-identified] : MMM. O/P clear.  [de-identified] : Supple No LAD  [de-identified] : Somewhat distant BS [de-identified] : S1 S2 [de-identified] : Trace pitting edema bilateral ankles [de-identified] : No spine/CVA tenderness  [de-identified] : Ambulates with cane [de-identified] : No rash or skin lesions noted.  Retention Suture Text: Retention sutures were placed to support the closure and prevent dehiscence.

## 2025-03-27 NOTE — HISTORY OF PRESENT ILLNESS
[Disease: _____________________] : Disease: [unfilled] [AJCC Stage: ____] : AJCC Stage: [unfilled] [de-identified] : 68F with metastatic NSCLC with adenocarcinoma histology with activating EGFR mutation.  In 2019, she was having eye surgery for a possible retinal detachment and a choroid lesion was noted with biopsy revealing malignancy.  She was sent to an ophthalmologist/oncologist (Dr. Kendall) who sent the patient for PET/CT in February 2019 revealing evidence of a left lower lobe lung primary tumor with intrathoracic lymph node involvement and evidence of bony metastatic disease.  Biopsy confirmed NSCLC with adenocarcinoma histology.  Peripheral blood CT-DNA testing revealed an activating EGFR mutation (exon 19 deletion).  She started osimertinib in March 2019.  She had transient left arm pain and swelling after starting the medication with negative Dopplers and a mild elevation of CK.  Medication was temporarily dose reduced to 40 mg before resuming full dose without recurrence of symptoms.  She responded to the medication.  Restaging CT in November 2020 revealed disease progression in the LLL primary tumor and a small left effusion.  She underwent VATS left lower lobectomy in December 2020 with pathology revealing a 2.1 cm adenocarcinoma with negative lymph nodes; molecular testing revealed her known EGFR exon 19 deletion mutation.  She continued on osimertinib.  A restaging PET/CT in May 2022 revealed findings concerning for disease progression, particularly in the left lung.  Follow-up CT chest in August 2022 revealed findings concerning for disease progression in the lungs and pleura.  She transferred care to [facility] in Sept 2022.  Peripheral blood ct-DNA testing from Sept 2022 was negative for any resistance mutations and just revealed her known EGFR mutation.   Began 2nd line Carbo/Pemetrexed in September 2022 with overall stable disease.  Completed 4 cycles of platinum doublet chemotherapy in mid November 2022 followed by pemetrexed maintenance as of early December 2022.  Developed disease progression in October 2024.  Therapy with amivantamab deferred due to concerns about ability to work throughout treatment. She started third-line gemcitabine on 11/7/24; achieved nice [place].   [de-identified] : Guardant ct-DNA 9/7/22 with EGFR exon 19 deletion, STK11;APC.  [de-identified] : Mr. Gould started third-line gemcitabine on 11/7/24 (3 weeks on, 1 week off); achieved [place].  Patient presents prior to planned treatment with start of C6. Reports bilateral LE edema.  Uses Lasix daily with some relief.  Also keeps legs elevated when able to and utilizes dexamethasone in the johana-- chemo setting.  Legs are down in the morning when she wakes up.  Reports HAWKINS.  She does not have a pulmonologist and recommended she see pulmonary. She was using tramadol for prior pain in the back, which helped, but has since stopped this as she has not needed it. Discussed the thyroid nodule noted on her scans; she wishes to hold off on further evaluation/ultrasound at this time.  Restaging PET/CT 3/19/2025 with deeper response.

## 2025-03-27 NOTE — HISTORY OF PRESENT ILLNESS
[Disease: _____________________] : Disease: [unfilled] [AJCC Stage: ____] : AJCC Stage: [unfilled] [de-identified] : 68F with metastatic NSCLC with adenocarcinoma histology with activating EGFR mutation.  In 2019, she was having eye surgery for a possible retinal detachment and a choroid lesion was noted with biopsy revealing malignancy.  She was sent to an ophthalmologist/oncologist (Dr. Kendall) who sent the patient for PET/CT in February 2019 revealing evidence of a left lower lobe lung primary tumor with intrathoracic lymph node involvement and evidence of bony metastatic disease.  Biopsy confirmed NSCLC with adenocarcinoma histology.  Peripheral blood CT-DNA testing revealed an activating EGFR mutation (exon 19 deletion).  She started osimertinib in March 2019.  She had transient left arm pain and swelling after starting the medication with negative Dopplers and a mild elevation of CK.  Medication was temporarily dose reduced to 40 mg before resuming full dose without recurrence of symptoms.  She responded to the medication.  Restaging CT in November 2020 revealed disease progression in the LLL primary tumor and a small left effusion.  She underwent VATS left lower lobectomy in December 2020 with pathology revealing a 2.1 cm adenocarcinoma with negative lymph nodes; molecular testing revealed her known EGFR exon 19 deletion mutation.  She continued on osimertinib.  A restaging PET/CT in May 2022 revealed findings concerning for disease progression, particularly in the left lung.  Follow-up CT chest in August 2022 revealed findings concerning for disease progression in the lungs and pleura.  She transferred care to [facility] in Sept 2022.  Peripheral blood ct-DNA testing from Sept 2022 was negative for any resistance mutations and just revealed her known EGFR mutation.   Began 2nd line Carbo/Pemetrexed in September 2022 with overall stable disease.  Completed 4 cycles of platinum doublet chemotherapy in mid November 2022 followed by pemetrexed maintenance as of early December 2022.  Developed disease progression in October 2024.  Therapy with amivantamab deferred due to concerns about ability to work throughout treatment. She started third-line gemcitabine on 11/7/24; achieved nice [place].   [de-identified] : Guardant ct-DNA 9/7/22 with EGFR exon 19 deletion, STK11;APC.  [de-identified] : Mr. Gould started third-line gemcitabine on 11/7/24 (3 weeks on, 1 week off); achieved [place].  Patient presents prior to planned treatment with start of C6. Reports bilateral LE edema.  Uses Lasix daily with some relief.  Also keeps legs elevated when able to and utilizes dexamethasone in the johana-- chemo setting.  Legs are down in the morning when she wakes up.  Reports HAWKINS.  She does not have a pulmonologist and recommended she see pulmonary. She was using tramadol for prior pain in the back, which helped, but has since stopped this as she has not needed it. Discussed the thyroid nodule noted on her scans; she wishes to hold off on further evaluation/ultrasound at this time.  Restaging PET/CT 3/19/2025 with deeper response.

## 2025-03-27 NOTE — ASSESSMENT
[Medication(s)] : Medication(s) [FreeTextEntry1] : 68F with NSCLC adenocarcinoma histology with activating EGFR mutation diagnosed in early 2019 treated with first-line osimertinib in March 2019. Developed oligometastatic disease progression in the primary tumor status post resection with left lower lobectomy in December 2020 and continued on osimertinib. Restaging CT from August 2022 with evidence of disease progression that does not appear amenable to local therapy. Started 2nd line chemotherapy with Carbo/Pemetrexed in Sept 2022 with overall stable disease. Completed 4 cycles of platinum doublet chemotherapy in mid-November 2022 followed by pemetrexed maintenance as of early December 2022. Restaging CT chest October 2024 with new right lung opacities suspected to be inflammatory with evidence of bony metastatic disease with increased sclerosis in the setting of increased back pain.  PET/CT Late Oct 2024 with evidence of disease progression. Started third-line gemcitabine on 11/7/24; achieved nice [place].   Restaging PET/CT 3/19/25 with deeper response.  Recommend: - Continue third-line gemcitabine 800mg/m2 IV weekly (3 weeks on, 1 week off) for as long as it benefits the patient. Starting Cycle 6 today.  - Deferring Amivantamab for potential future line of therapy due to patient concerns of infusion time/ability to work.  - Neoplasm-related pain: Utilizing acetaminophen PRN.  Has tramadol to use if needed which helped in past. Of note, reports allergy to oxycodone with pruritus as a reaction; this was likely a side effect and not a true allergy. -Saw Orthopedic Oncology evaluation of bony findings:  No surgical intervention recommended -Saw Rad-Onc in consultation for possible palliative RT to painful bony metastases; patient declined RT - Repeat labs today. Can monitor periodic CEA.  - Anemia likely due to chemotherapy.  Monitor hemoglobin.  - Thyroid nodule: Can consider thyroid ultrasound for further evaluation.  Patient wishes to hold off for now. - Recommend pulmonary evaluation of ongoing dyspnea.  Deconditioning is likely a component.  Recommendation provided. - Declined addition of a bone modifying medication to decrease risk for skeletal related event.  - Bilateral ankle/foot edema. Mild currently. Likely from gemcitabine. Continue leg elevation. Utilizing dexamethasone 4mg by mouth daily x 3 days after each gemcitabine infusion which pt states helps. Continue furosemide 20mg by mouth daily.   - Previously discussed mediport placement  - Continue follow-up on day 1 of each cycle.   - Plan to obtain her next restaging scan in June prior to the MD visit that month Patient scheduled through June currently

## 2025-03-27 NOTE — PHYSICAL EXAM
[Restricted in physically strenuous activity but ambulatory and able to carry out work of a light or sedentary nature] : Status 1- Restricted in physically strenuous activity but ambulatory and able to carry out work of a light or sedentary nature, e.g., light house work, office work [Normal] : affect appropriate [de-identified] : Soft, nontender, nondistended. BS+ [de-identified] : No icterus [de-identified] : MMM. O/P clear.  [de-identified] : Supple No LAD  [de-identified] : Somewhat distant BS [de-identified] : S1 S2 [de-identified] : Trace pitting edema bilateral ankles [de-identified] : No spine/CVA tenderness  [de-identified] : Ambulates with cane [de-identified] : No rash or skin lesions noted.

## 2025-04-23 NOTE — ASSESSMENT
[FreeTextEntry1] : 68F with NSCLC adenocarcinoma histology with activating EGFR mutation diagnosed in early 2019 treated with first-line osimertinib in March 2019. Developed oligometastatic disease progression in the primary tumor status post resection with left lower lobectomy in December 2020 and continued on osimertinib. Restaging CT from August 2022 with evidence of disease progression that does not appear amenable to local therapy. Started 2nd line chemotherapy with Carbo/Pemetrexed in Sept 2022 with overall stable disease. Completed 4 cycles of platinum doublet chemotherapy in mid-November 2022 followed by pemetrexed maintenance as of early December 2022. Restaging CT chest October 2024 with new right lung opacities suspected to be inflammatory with evidence of bony metastatic disease with increased sclerosis in the setting of increased back pain.  PET/CT Late Oct 2024 with evidence of disease progression. Started third-line gemcitabine on 11/7/24; achieved nice [place].   Restaging PET/CT 3/19/25 with deeper response.  Recommend: - Continue third-line gemcitabine 800mg/m2 IV weekly (3 weeks on, 1 week off) for as long as it benefits the patient. Starting Cycle 7 today.  - Deferring Amivantamab for potential future line of therapy due to patient concerns of infusion time/ability to work.  - Neoplasm-related pain: Utilizing acetaminophen and tramadol PRN. Refill for tramadol sent. Of note, reports allergy to oxycodone with pruritus as a reaction; this was likely a side effect and not a true allergy. -Saw Orthopedic Oncology evaluation of bony findings:  No surgical intervention recommended -Saw Rad-Onc in consultation for possible palliative RT to painful bony metastases; patient declined RT - Repeat labs today. Can monitor periodic CEA.  - Anemia likely due to chemotherapy.  Monitor hemoglobin.  - Thyroid nodule: Can consider thyroid ultrasound for further evaluation.  Patient wishes to hold off for now. - Recommend pulmonary evaluation of ongoing dyspnea.  Deconditioning is likely a component.  Recommendation provided. Pt reminded to make the appt.  - Declined addition of a bone modifying medication to decrease risk for skeletal related event.  - Bilateral ankle/foot edema. Mild currently. Likely from gemcitabine. Continue leg elevation. Utilizing dexamethasone 4mg by mouth daily x 3 days after each gemcitabine infusion which pt states helps. Continue furosemide 20mg by mouth daily.   - Previously discussed mediport placement  - Continue follow-up on day 1 of each cycle.   - Plan to obtain her next restaging scan in June prior to the MD visit that month Patient scheduled through June currently [Medication(s)] : Medication(s)

## 2025-04-23 NOTE — PHYSICAL EXAM
[Restricted in physically strenuous activity but ambulatory and able to carry out work of a light or sedentary nature] : Status 1- Restricted in physically strenuous activity but ambulatory and able to carry out work of a light or sedentary nature, e.g., light house work, office work [Normal] : affect appropriate [de-identified] : No icterus [de-identified] : MMM. O/P clear. No mouth sores.  [de-identified] : Supple No LAD  [de-identified] : CTAB [de-identified] : S1 S2 [de-identified] : Trace pitting edema bilateral ankles [de-identified] : No spine/CVA tenderness  [de-identified] : Ambulates with cane [de-identified] : No rash or skin lesions noted.

## 2025-04-23 NOTE — HISTORY OF PRESENT ILLNESS
[Disease: _____________________] : Disease: [unfilled] [AJCC Stage: ____] : AJCC Stage: [unfilled] [de-identified] : 68F with metastatic NSCLC with adenocarcinoma histology with activating EGFR mutation.  In 2019, she was having eye surgery for a possible retinal detachment and a choroid lesion was noted with biopsy revealing malignancy.  She was sent to an ophthalmologist/oncologist (Dr. Kendall) who sent the patient for PET/CT in February 2019 revealing evidence of a left lower lobe lung primary tumor with intrathoracic lymph node involvement and evidence of bony metastatic disease.  Biopsy confirmed NSCLC with adenocarcinoma histology.  Peripheral blood CT-DNA testing revealed an activating EGFR mutation (exon 19 deletion).  She started osimertinib in March 2019.  She had transient left arm pain and swelling after starting the medication with negative Dopplers and a mild elevation of CK.  Medication was temporarily dose reduced to 40 mg before resuming full dose without recurrence of symptoms.  She responded to the medication.  Restaging CT in November 2020 revealed disease progression in the LLL primary tumor and a small left effusion.  She underwent VATS left lower lobectomy in December 2020 with pathology revealing a 2.1 cm adenocarcinoma with negative lymph nodes; molecular testing revealed her known EGFR exon 19 deletion mutation.  She continued on osimertinib.  A restaging PET/CT in May 2022 revealed findings concerning for disease progression, particularly in the left lung.  Follow-up CT chest in August 2022 revealed findings concerning for disease progression in the lungs and pleura.  She transferred care to [facility] in Sept 2022.  Peripheral blood ct-DNA testing from Sept 2022 was negative for any resistance mutations and just revealed her known EGFR mutation.   Began 2nd line Carbo/Pemetrexed in September 2022 with overall stable disease.  Completed 4 cycles of platinum doublet chemotherapy in mid November 2022 followed by pemetrexed maintenance as of early December 2022.  Developed disease progression in October 2024.  Therapy with amivantamab deferred due to concerns about ability to work throughout treatment. She started third-line gemcitabine on 11/7/24; achieved nice CT.   [de-identified] : Guardant ct-DNA 9/7/22 with EGFR exon 19 deletion, STK11;APC.  [de-identified] : Mr. Gould started third-line gemcitabine on 11/7/24 (3 weeks on, 1 week off); achieved [place]. Restaging PET/CT 3/19/2025 with deeper response. Patient presents prior to planned treatment with start of C7. Pt reports taking furosemide daily to control the leg swelling. She states she has some dyspnea if she skips a dose. She has the number for pulmonologist but has not yet made an appt. Takes acetaminophen for generalized discomfort and occasional tramadol as needed. Asks for refill.  Denies F/C/N/V, cough, hemoptysis, diarrhea, rash, myalgias.

## 2025-04-23 NOTE — HISTORY OF PRESENT ILLNESS
[Disease: _____________________] : Disease: [unfilled] [AJCC Stage: ____] : AJCC Stage: [unfilled] [de-identified] : 68F with metastatic NSCLC with adenocarcinoma histology with activating EGFR mutation.  In 2019, she was having eye surgery for a possible retinal detachment and a choroid lesion was noted with biopsy revealing malignancy.  She was sent to an ophthalmologist/oncologist (Dr. Kendall) who sent the patient for PET/CT in February 2019 revealing evidence of a left lower lobe lung primary tumor with intrathoracic lymph node involvement and evidence of bony metastatic disease.  Biopsy confirmed NSCLC with adenocarcinoma histology.  Peripheral blood CT-DNA testing revealed an activating EGFR mutation (exon 19 deletion).  She started osimertinib in March 2019.  She had transient left arm pain and swelling after starting the medication with negative Dopplers and a mild elevation of CK.  Medication was temporarily dose reduced to 40 mg before resuming full dose without recurrence of symptoms.  She responded to the medication.  Restaging CT in November 2020 revealed disease progression in the LLL primary tumor and a small left effusion.  She underwent VATS left lower lobectomy in December 2020 with pathology revealing a 2.1 cm adenocarcinoma with negative lymph nodes; molecular testing revealed her known EGFR exon 19 deletion mutation.  She continued on osimertinib.  A restaging PET/CT in May 2022 revealed findings concerning for disease progression, particularly in the left lung.  Follow-up CT chest in August 2022 revealed findings concerning for disease progression in the lungs and pleura.  She transferred care to [facility] in Sept 2022.  Peripheral blood ct-DNA testing from Sept 2022 was negative for any resistance mutations and just revealed her known EGFR mutation.   Began 2nd line Carbo/Pemetrexed in September 2022 with overall stable disease.  Completed 4 cycles of platinum doublet chemotherapy in mid November 2022 followed by pemetrexed maintenance as of early December 2022.  Developed disease progression in October 2024.  Therapy with amivantamab deferred due to concerns about ability to work throughout treatment. She started third-line gemcitabine on 11/7/24; achieved nice [place].   [de-identified] : Guardant ct-DNA 9/7/22 with EGFR exon 19 deletion, STK11;APC.  [de-identified] : Mr. Gould started third-line gemcitabine on 11/7/24 (3 weeks on, 1 week off); achieved [place]. Restaging PET/CT 3/19/2025 with deeper response. Patient presents prior to planned treatment with start of C7. Pt reports taking furosemide daily to control the leg swelling. She states she has some dyspnea if she skips a dose. She has the number for pulmonologist but has not yet made an appt. Takes acetaminophen for generalized discomfort and occasional tramadol as needed. Asks for refill.  Denies F/C/N/V, cough, hemoptysis, diarrhea, rash, myalgias.

## 2025-04-23 NOTE — PHYSICAL EXAM
[Restricted in physically strenuous activity but ambulatory and able to carry out work of a light or sedentary nature] : Status 1- Restricted in physically strenuous activity but ambulatory and able to carry out work of a light or sedentary nature, e.g., light house work, office work [Normal] : affect appropriate [de-identified] : No icterus [de-identified] : MMM. O/P clear. No mouth sores.  [de-identified] : Supple No LAD  [de-identified] : CTAB [de-identified] : S1 S2 [de-identified] : Trace pitting edema bilateral ankles [de-identified] : No spine/CVA tenderness  [de-identified] : Ambulates with cane [de-identified] : No rash or skin lesions noted.

## 2025-04-23 NOTE — ASSESSMENT
[FreeTextEntry1] : 68F with NSCLC adenocarcinoma histology with activating EGFR mutation diagnosed in early 2019 treated with first-line osimertinib in March 2019. Developed oligometastatic disease progression in the primary tumor status post resection with left lower lobectomy in December 2020 and continued on osimertinib. Restaging CT from August 2022 with evidence of disease progression that does not appear amenable to local therapy. Started 2nd line chemotherapy with Carbo/Pemetrexed in Sept 2022 with overall stable disease. Completed 4 cycles of platinum doublet chemotherapy in mid-November 2022 followed by pemetrexed maintenance as of early December 2022. Restaging CT chest October 2024 with new right lung opacities suspected to be inflammatory with evidence of bony metastatic disease with increased sclerosis in the setting of increased back pain.  PET/CT Late Oct 2024 with evidence of disease progression. Started third-line gemcitabine on 11/7/24; achieved nice [place].   Restaging PET/CT 3/19/25 with deeper response.  Recommend: - Continue third-line gemcitabine 800mg/m2 IV weekly (3 weeks on, 1 week off) for as long as it benefits the patient. Starting Cycle 7 today.  - Deferring Amivantamab for potential future line of therapy due to patient concerns of infusion time/ability to work.  - Neoplasm-related pain: Utilizing acetaminophen and tramadol PRN. Refill for tramadol sent. Of note, reports allergy to oxycodone with pruritus as a reaction; this was likely a side effect and not a true allergy. -Saw Orthopedic Oncology evaluation of bony findings:  No surgical intervention recommended -Saw Rad-Onc in consultation for possible palliative RT to painful bony metastases; patient declined RT - Repeat labs today. Can monitor periodic CEA.  - Anemia likely due to chemotherapy.  Monitor hemoglobin.  - Thyroid nodule: Can consider thyroid ultrasound for further evaluation.  Patient wishes to hold off for now. - Recommend pulmonary evaluation of ongoing dyspnea.  Deconditioning is likely a component.  Recommendation provided. Pt reminded to make the appt.  - Declined addition of a bone modifying medication to decrease risk for skeletal related event.  - Bilateral ankle/foot edema. Mild currently. Likely from gemcitabine. Continue leg elevation. Utilizing dexamethasone 4mg by mouth daily x 3 days after each gemcitabine infusion which pt states helps. Continue furosemide 20mg by mouth daily.   - Previously discussed mediport placement  - Continue follow-up on day 1 of each cycle.   - Plan to obtain her next restaging scan in June prior to the MD visit that month Patient scheduled through June currently [Medication(s)] : Medication(s)

## 2025-05-29 NOTE — PHYSICAL EXAM
[Restricted in physically strenuous activity but ambulatory and able to carry out work of a light or sedentary nature] : Status 1- Restricted in physically strenuous activity but ambulatory and able to carry out work of a light or sedentary nature, e.g., light house work, office work [Normal] : affect appropriate [de-identified] : No icterus. EOMI [de-identified] : MMM. O/P clear. No mouth sores.  [de-identified] : Supple No LAD  [de-identified] : CTAB [de-identified] : S1 S2 [de-identified] : Trace pitting edema bilateral ankles [de-identified] : No spine/CVA tenderness  [de-identified] : Ambulates with cane, utilizes wheelchair for longer distances [de-identified] : No rash or skin lesions noted.

## 2025-05-29 NOTE — ASSESSMENT
[FreeTextEntry1] : NSCLC adenocarcinoma histology with activating EGFR mutation diagnosed in early 2019 treated with first-line osimertinib in March 2019. Developed oligometastatic disease progression in the primary tumor status post resection with left lower lobectomy in December 2020 and continued on osimertinib. Restaging CT from August 2022 with evidence of disease progression that does not appear amenable to local therapy. Started 2nd line chemotherapy with Carbo/Pemetrexed in Sept 2022 with overall stable disease. Completed 4 cycles of platinum doublet chemotherapy in mid-November 2022 followed by pemetrexed maintenance as of early December 2022. Restaging CT chest October 2024 with new right lung opacities suspected to be inflammatory with evidence of bony metastatic disease with increased sclerosis in the setting of increased back pain.  PET/CT Late Oct 2024 with evidence of disease progression. Started third-line gemcitabine on 11/7/24; achieved nice [place].   Restaging PET/CT 3/19/25 with deeper response.  Recommend: - Continue third-line gemcitabine 800mg/m2 IV weekly (3 weeks on, 1 week off) for as long as it benefits the patient. Starting Cycle 8 today.  - Deferring Amivantamab for potential future line of therapy due to patient concerns of infusion time/ability to work.  - Neoplasm-related pain: Utilizing acetaminophen and tramadol PRN. Of note, reports allergy to oxycodone with pruritus as a reaction; this was likely a side effect and not a true allergy. -Saw Orthopedic Oncology evaluation of bony findings:  No surgical intervention recommended -Saw Rad-Onc in consultation for possible palliative RT to painful bony metastases; patient declined RT - Repeat labs today. Can monitor periodic CEA.  - Anemia likely due to chemotherapy.  Monitor hemoglobin.  - Thyroid nodule: Can consider thyroid ultrasound for further evaluation.  Patient wishes to hold off for now. - Recommend pulmonary evaluation of ongoing dyspnea.  Deconditioning is likely a component.  Recommendation provided. Pt not amenable to see pulmonary at this time. - Declined addition of a bone modifying medication to decrease risk for skeletal related event.  - Bilateral ankle/foot edema. Mild currently. Likely from gemcitabine. Continue leg elevation, compression socks. Utilizing dexamethasone 4mg by mouth daily x 3 days after each gemcitabine infusion which pt states helps. Continue furosemide 20mg by mouth daily if needed. - Previously discussed mediport placement  - Continue follow-up on day 1 of each cycle.   - Plan to obtain her next restaging scan in June prior to the MD visit that month Patient scheduled through July currently [Medication(s)] : Medication(s)

## 2025-05-29 NOTE — PHYSICAL EXAM
[Restricted in physically strenuous activity but ambulatory and able to carry out work of a light or sedentary nature] : Status 1- Restricted in physically strenuous activity but ambulatory and able to carry out work of a light or sedentary nature, e.g., light house work, office work [Normal] : affect appropriate [de-identified] : No icterus. EOMI [de-identified] : MMM. O/P clear. No mouth sores.  [de-identified] : Supple No LAD  [de-identified] : CTAB [de-identified] : S1 S2 [de-identified] : Trace pitting edema bilateral ankles [de-identified] : No spine/CVA tenderness  [de-identified] : Ambulates with cane, utilizes wheelchair for longer distances [de-identified] : No rash or skin lesions noted.

## 2025-05-29 NOTE — ASSESSMENT
[FreeTextEntry1] : NSCLC adenocarcinoma histology with activating EGFR mutation diagnosed in early 2019 treated with first-line osimertinib in March 2019. Developed oligometastatic disease progression in the primary tumor status post resection with left lower lobectomy in December 2020 and continued on osimertinib. Restaging CT from August 2022 with evidence of disease progression that does not appear amenable to local therapy. Started 2nd line chemotherapy with Carbo/Pemetrexed in Sept 2022 with overall stable disease. Completed 4 cycles of platinum doublet chemotherapy in mid-November 2022 followed by pemetrexed maintenance as of early December 2022. Restaging CT chest October 2024 with new right lung opacities suspected to be inflammatory with evidence of bony metastatic disease with increased sclerosis in the setting of increased back pain.  PET/CT Late Oct 2024 with evidence of disease progression. Started third-line gemcitabine on 11/7/24; achieved nice [place].   Restaging PET/CT 3/19/25 with deeper response.  Recommend: - Continue third-line gemcitabine 800mg/m2 IV weekly (3 weeks on, 1 week off) for as long as it benefits the patient. Starting Cycle 8 today.  - Deferring Amivantamab for potential future line of therapy due to patient concerns of infusion time/ability to work.  - Neoplasm-related pain: Utilizing acetaminophen and tramadol PRN. Of note, reports allergy to oxycodone with pruritus as a reaction; this was likely a side effect and not a true allergy. -Saw Orthopedic Oncology evaluation of bony findings:  No surgical intervention recommended -Saw Rad-Onc in consultation for possible palliative RT to painful bony metastases; patient declined RT - Repeat labs today. Can monitor periodic CEA.  - Anemia likely due to chemotherapy.  Monitor hemoglobin.  - Thyroid nodule: Can consider thyroid ultrasound for further evaluation.  Patient wishes to hold off for now. - Recommend pulmonary evaluation of ongoing dyspnea.  Deconditioning is likely a component.  Recommendation provided. Pt not amenable to see pulmonary at this time. - Declined addition of a bone modifying medication to decrease risk for skeletal related event.  - Bilateral ankle/foot edema. Mild currently. Likely from gemcitabine. Continue leg elevation, compression socks. Utilizing dexamethasone 4mg by mouth daily x 3 days after each gemcitabine infusion which pt states helps. Continue furosemide 20mg by mouth daily if needed. - Previously discussed mediport placement  - Continue follow-up on day 1 of each cycle.   - Plan to obtain her next restaging scan in June prior to the MD visit that month Patient scheduled through July currently [Medication(s)] : Medication(s)

## 2025-05-29 NOTE — HISTORY OF PRESENT ILLNESS
[Disease: _____________________] : Disease: [unfilled] [AJCC Stage: ____] : AJCC Stage: [unfilled] [de-identified] : 68F with metastatic NSCLC with adenocarcinoma histology with activating EGFR mutation.  In 2019, she was having eye surgery for a possible retinal detachment and a choroid lesion was noted with biopsy revealing malignancy.  She was sent to an ophthalmologist/oncologist (Dr. Kendall) who sent the patient for PET/CT in February 2019 revealing evidence of a left lower lobe lung primary tumor with intrathoracic lymph node involvement and evidence of bony metastatic disease.  Biopsy confirmed NSCLC with adenocarcinoma histology.  Peripheral blood CT-DNA testing revealed an activating EGFR mutation (exon 19 deletion).  She started osimertinib in March 2019.  She had transient left arm pain and swelling after starting the medication with negative Dopplers and a mild elevation of CK.  Medication was temporarily dose reduced to 40 mg before resuming full dose without recurrence of symptoms.  She responded to the medication.  Restaging CT in November 2020 revealed disease progression in the LLL primary tumor and a small left effusion.  She underwent VATS left lower lobectomy in December 2020 with pathology revealing a 2.1 cm adenocarcinoma with negative lymph nodes; molecular testing revealed her known EGFR exon 19 deletion mutation.  She continued on osimertinib.  A restaging PET/CT in May 2022 revealed findings concerning for disease progression, particularly in the left lung.  Follow-up CT chest in August 2022 revealed findings concerning for disease progression in the lungs and pleura.  She transferred care to [facility] in Sept 2022.  Peripheral blood ct-DNA testing from Sept 2022 was negative for any resistance mutations and just revealed her known EGFR mutation.   Began 2nd line Carbo/Pemetrexed in September 2022 with overall stable disease.  Completed 4 cycles of platinum doublet chemotherapy in mid November 2022 followed by pemetrexed maintenance as of early December 2022.  Developed disease progression in October 2024.  Therapy with amivantamab deferred due to concerns about ability to work throughout treatment. She started third-line gemcitabine on 11/7/24; achieved nice [place].   [de-identified] : Guardant ct-DNA 9/7/22 with EGFR exon 19 deletion, STK11;APC.  [de-identified] : Mr. Gould started third-line gemcitabine on 11/7/24 (3 weeks on, 1 week off); achieved [place]. Restaging PET/CT 3/19/2025 with deeper response. Patient presents prior to planned treatment with start of C8. States she did not take Lasix during days of last infusion, and swelling was worse. Because of this, she went back on lasix late last week but reports minimal improvement.. She reports worsening dyspnea with exertion/walking long distances. She is wearing compression socks and does leg elevation daily to help with edema. Continues to take dexamethasone for 3 days after tx, which is also helping. Patient is not amenable to seeing Pulmonologist at this time. Takes acetaminophen for generalized discomfort and occasional tramadol as needed.  Denies fever, mucositis, chest pain, cough, nausea/vomiting, diarrhea/constipation, abdominal pain, rash/pruritus.

## 2025-05-29 NOTE — RESULTS/DATA
[FreeTextEntry1] : - PET/CT 5/11/2022: FDG avid nodular pleural thickening in the left lung base concerning for metastatic disease.  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  Additional 2-3 mm nodules in the right lung.  Persistent focal hypermetabolic activity in the anus.  - CT chest 8/10/2022: Overall findings are worrisome for worsening metastatic disease in both lungs and pleura.  -CT Chest 11/4/22:  Since 8/10/2022:  Unchanged irregular and nodular interlobular septal thickening in the posterior aspect of the left upper lobe and bilateral sub-5 mm lung nodules (and new compared to 2/20/2022).  Increased size of multiple skeletal sclerotic lesions.  -MRI Brain 11/6/22:  No acute intracranial hemorrhage, acute infarction, extra-axial fluid collection or hydrocephalus.  No enhancing intracranial lesion identified to suggest intracranial metastases.    --CT Chest 2/3/23: Decreased small loculated left pleural effusion with resolution of left pleural gas. Numerous stable sub-4 mm nodules in both lungs. No new or enlarging lung nodule. Increasing sclerosis of numerous bone metastasis.  -CT Chest 4/28/23:  In comparison with 2/3/2023, new nodular groundglass opacities throughout the right lung.  Stable micronodules along the pleural and fissural surfaces.  Left lower lobectomy with stable postsurgical changes. Stable septal thickening throughout the left upper lobe and right lower lobe. Stable left pleural thickening. Stable trace left pleural effusion.  Stable metastatic sclerotic bone lesions.  -CT Chest 6/28/23:  Compared to the previous examination, no significant change is noted.  -CT Chest 9/18/23:  Numerous bilateral 1-2 mm pulmonary nodules are unchanged since June 28, 2023.  Stable osseous metastases.  -CT Chest 12/27/23:  Increased 5 mm right perifissural nodule since September 2023. Other numerous small bilateral pulmonary nodules are unchanged.  Osteosclerotic metastases with increased sclerosis of the left anterior fourth rib.  Stable left pleural thickening and lower lobe septal thickening (?lymphangitic carcinomatosis).   -CT Chest 3/27/24: Previously described increased 5 mm right perifissural nodule has resolved, and may had represented area of atelectasis. No new or enlarging pulmonary nodule. Post left lower lobectomy. Osteosclerotic metastases, unchanged.  -CT Chest 6/18/24:  Interval resolution of previously seen posterior right lower lobe peribronchovascular groundglass opacities, which were likely infectious/inflammatory. Otherwise stable exam status post left lower lobectomy with pleural and septal thickening, numerous bilateral small nodules, and osteosclerotic metastases.  -CT chest 10/2/24: A few right mid to lower lung ill-defined groundglass nodular opacity opacities new since June 18, 2024. Differential diagnostic considerations include infectious or inflammatory etiologies. A 1-3 month follow-up noncontrast chest CT is recommended for further evaluation. Bilateral pulmonary nodules appear unchanged. Bony metastatic disease.  -PET/CT 10/23/24:  Abnormal skull-to-thigh FDG-PET/CT scan. 1. Nonspecific mildly FDG-avid patchy opacity in the right lower lobe is increased since 10/2/2024, and is new as compared to prior PET/CT dated 5/11/2022. Correlate clinically for infection. A 1 month follow-up with dedicated CT of chest may be obtained for further characterization. 2. Multiple FDG-avid sclerotic and difficult to delineate osseous metastases in the axial and appendicular skeleton are new as compared to prior PET/CT. 3. A moderate FDG-avid mid thoracic compression fracture/deformity is similar in appearance as compared to recent CT and demonstrates increased loss of height and new FDG avidity as compared to prior PET/CT.  -MRI Brain 12/11/24:  No evidence of intracranial metastasis. No acute intracranial hemorrhage or acute infarct. Mild chronic microvascular ischemic changes  - PET/CT 12/18/24:  Compared to FDG-PET/CT scan dated 10/23/2024:  1. Interval response to therapy in numerous FDG avid osseous lesions, which are decreased in FDG avidity. Residual disease is evident.  2. Near resolution of patchy right lower lobe groundglass opacities, with residual right basilar FDG avid groundglass opacity. This likely represents resolving infection.  3. Similar FDG avid left thyroid nodule. Recommend correlation with ultrasound to evaluate for signs of malignancy if not previously performed.  4. FDG avid left femoral head lesion with mild cortical thinning may place patient at risk of future pathologic fracture. No definitive fracture on today's exam.  -PET/CT 3/19/25:  Compared to 12/18/2024 FDG PET/CT:  1.Interval continued response to therapy in numerous FDG avid osseous lesions, many which have continued to have further decreased FDG activity. Residual neoplastic disease is still evident. No new FDG avid osseous lesion.  2. Mild right patchy ground glass opacities with residual mild activity similar to prior.  3. Similar FDG avid left thyroid nodule, consider correlation with thyroid ultrasound to evaluate for malignancy if not already performed.  4. Resolution of previously noted hypermetabolism associated with the femoral lesion; this is otherwise stable appearance on CT with mild cortical thinning. This may place patient at risk for future pathologic fracture.  5. New/increased activity within the rectal region, without definite corresponding abnormality on CT. This may be inflammatory and may be assessed clinically. Otherwise no new abnormal FDG uptake elsewhere.

## 2025-05-29 NOTE — RESULTS/DATA
Skin normal color for race, warm, dry and intact. No evidence of rash. [FreeTextEntry1] : - PET/CT 5/11/2022: FDG avid nodular pleural thickening in the left lung base concerning for metastatic disease.  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  Additional 2-3 mm nodules in the right lung.  Persistent focal hypermetabolic activity in the anus.  - CT chest 8/10/2022: Overall findings are worrisome for worsening metastatic disease in both lungs and pleura.  -CT Chest 11/4/22:  Since 8/10/2022:  Unchanged irregular and nodular interlobular septal thickening in the posterior aspect of the left upper lobe and bilateral sub-5 mm lung nodules (and new compared to 2/20/2022).  Increased size of multiple skeletal sclerotic lesions.  -MRI Brain 11/6/22:  No acute intracranial hemorrhage, acute infarction, extra-axial fluid collection or hydrocephalus.  No enhancing intracranial lesion identified to suggest intracranial metastases.    --CT Chest 2/3/23: Decreased small loculated left pleural effusion with resolution of left pleural gas. Numerous stable sub-4 mm nodules in both lungs. No new or enlarging lung nodule. Increasing sclerosis of numerous bone metastasis.  -CT Chest 4/28/23:  In comparison with 2/3/2023, new nodular groundglass opacities throughout the right lung.  Stable micronodules along the pleural and fissural surfaces.  Left lower lobectomy with stable postsurgical changes. Stable septal thickening throughout the left upper lobe and right lower lobe. Stable left pleural thickening. Stable trace left pleural effusion.  Stable metastatic sclerotic bone lesions.  -CT Chest 6/28/23:  Compared to the previous examination, no significant change is noted.  -CT Chest 9/18/23:  Numerous bilateral 1-2 mm pulmonary nodules are unchanged since June 28, 2023.  Stable osseous metastases.  -CT Chest 12/27/23:  Increased 5 mm right perifissural nodule since September 2023. Other numerous small bilateral pulmonary nodules are unchanged.  Osteosclerotic metastases with increased sclerosis of the left anterior fourth rib.  Stable left pleural thickening and lower lobe septal thickening (?lymphangitic carcinomatosis).   -CT Chest 3/27/24: Previously described increased 5 mm right perifissural nodule has resolved, and may had represented area of atelectasis. No new or enlarging pulmonary nodule. Post left lower lobectomy. Osteosclerotic metastases, unchanged.  -CT Chest 6/18/24:  Interval resolution of previously seen posterior right lower lobe peribronchovascular groundglass opacities, which were likely infectious/inflammatory. Otherwise stable exam status post left lower lobectomy with pleural and septal thickening, numerous bilateral small nodules, and osteosclerotic metastases.  -CT chest 10/2/24: A few right mid to lower lung ill-defined groundglass nodular opacity opacities new since June 18, 2024. Differential diagnostic considerations include infectious or inflammatory etiologies. A 1-3 month follow-up noncontrast chest CT is recommended for further evaluation. Bilateral pulmonary nodules appear unchanged. Bony metastatic disease.  -PET/CT 10/23/24:  Abnormal skull-to-thigh FDG-PET/CT scan. 1. Nonspecific mildly FDG-avid patchy opacity in the right lower lobe is increased since 10/2/2024, and is new as compared to prior PET/CT dated 5/11/2022. Correlate clinically for infection. A 1 month follow-up with dedicated CT of chest may be obtained for further characterization. 2. Multiple FDG-avid sclerotic and difficult to delineate osseous metastases in the axial and appendicular skeleton are new as compared to prior PET/CT. 3. A moderate FDG-avid mid thoracic compression fracture/deformity is similar in appearance as compared to recent CT and demonstrates increased loss of height and new FDG avidity as compared to prior PET/CT.  -MRI Brain 12/11/24:  No evidence of intracranial metastasis. No acute intracranial hemorrhage or acute infarct. Mild chronic microvascular ischemic changes  - PET/CT 12/18/24:  Compared to FDG-PET/CT scan dated 10/23/2024:  1. Interval response to therapy in numerous FDG avid osseous lesions, which are decreased in FDG avidity. Residual disease is evident.  2. Near resolution of patchy right lower lobe groundglass opacities, with residual right basilar FDG avid groundglass opacity. This likely represents resolving infection.  3. Similar FDG avid left thyroid nodule. Recommend correlation with ultrasound to evaluate for signs of malignancy if not previously performed.  4. FDG avid left femoral head lesion with mild cortical thinning may place patient at risk of future pathologic fracture. No definitive fracture on today's exam.  -PET/CT 3/19/25:  Compared to 12/18/2024 FDG PET/CT:  1.Interval continued response to therapy in numerous FDG avid osseous lesions, many which have continued to have further decreased FDG activity. Residual neoplastic disease is still evident. No new FDG avid osseous lesion.  2. Mild right patchy ground glass opacities with residual mild activity similar to prior.  3. Similar FDG avid left thyroid nodule, consider correlation with thyroid ultrasound to evaluate for malignancy if not already performed.  4. Resolution of previously noted hypermetabolism associated with the femoral lesion; this is otherwise stable appearance on CT with mild cortical thinning. This may place patient at risk for future pathologic fracture.  5. New/increased activity within the rectal region, without definite corresponding abnormality on CT. This may be inflammatory and may be assessed clinically. Otherwise no new abnormal FDG uptake elsewhere.

## 2025-05-29 NOTE — HISTORY OF PRESENT ILLNESS
[Disease: _____________________] : Disease: [unfilled] [AJCC Stage: ____] : AJCC Stage: [unfilled] [de-identified] : 68F with metastatic NSCLC with adenocarcinoma histology with activating EGFR mutation.  In 2019, she was having eye surgery for a possible retinal detachment and a choroid lesion was noted with biopsy revealing malignancy.  She was sent to an ophthalmologist/oncologist (Dr. Kendall) who sent the patient for PET/CT in February 2019 revealing evidence of a left lower lobe lung primary tumor with intrathoracic lymph node involvement and evidence of bony metastatic disease.  Biopsy confirmed NSCLC with adenocarcinoma histology.  Peripheral blood CT-DNA testing revealed an activating EGFR mutation (exon 19 deletion).  She started osimertinib in March 2019.  She had transient left arm pain and swelling after starting the medication with negative Dopplers and a mild elevation of CK.  Medication was temporarily dose reduced to 40 mg before resuming full dose without recurrence of symptoms.  She responded to the medication.  Restaging CT in November 2020 revealed disease progression in the LLL primary tumor and a small left effusion.  She underwent VATS left lower lobectomy in December 2020 with pathology revealing a 2.1 cm adenocarcinoma with negative lymph nodes; molecular testing revealed her known EGFR exon 19 deletion mutation.  She continued on osimertinib.  A restaging PET/CT in May 2022 revealed findings concerning for disease progression, particularly in the left lung.  Follow-up CT chest in August 2022 revealed findings concerning for disease progression in the lungs and pleura.  She transferred care to [facility] in Sept 2022.  Peripheral blood ct-DNA testing from Sept 2022 was negative for any resistance mutations and just revealed her known EGFR mutation.   Began 2nd line Carbo/Pemetrexed in September 2022 with overall stable disease.  Completed 4 cycles of platinum doublet chemotherapy in mid November 2022 followed by pemetrexed maintenance as of early December 2022.  Developed disease progression in October 2024.  Therapy with amivantamab deferred due to concerns about ability to work throughout treatment. She started third-line gemcitabine on 11/7/24; achieved nice [place].   [de-identified] : Guardant ct-DNA 9/7/22 with EGFR exon 19 deletion, STK11;APC.  [de-identified] : Mr. Gould started third-line gemcitabine on 11/7/24 (3 weeks on, 1 week off); achieved [place]. Restaging PET/CT 3/19/2025 with deeper response. Patient presents prior to planned treatment with start of C8. States she did not take Lasix during days of last infusion, and swelling was worse. Because of this, she went back on lasix late last week but reports minimal improvement.. She reports worsening dyspnea with exertion/walking long distances. She is wearing compression socks and does leg elevation daily to help with edema. Continues to take dexamethasone for 3 days after tx, which is also helping. Patient is not amenable to seeing Pulmonologist at this time. Takes acetaminophen for generalized discomfort and occasional tramadol as needed.  Denies fever, mucositis, chest pain, cough, nausea/vomiting, diarrhea/constipation, abdominal pain, rash/pruritus.

## 2025-06-19 NOTE — PHYSICAL EXAM
[Restricted in physically strenuous activity but ambulatory and able to carry out work of a light or sedentary nature] : Status 1- Restricted in physically strenuous activity but ambulatory and able to carry out work of a light or sedentary nature, e.g., light house work, office work [Normal] : affect appropriate [de-identified] : No icterus. EOMI [de-identified] : Supple No LAD  [de-identified] : MMM. O/P clear. No mouth sores.  [de-identified] : CTAB [de-identified] : S1 S2 [de-identified] : Trace pitting edema bilateral ankles [de-identified] : No spine/CVA tenderness  [de-identified] : No rash or skin lesions noted.  [de-identified] : Ambulates with cane, utilizes wheelchair for longer distances

## 2025-06-19 NOTE — PHYSICAL EXAM
[Restricted in physically strenuous activity but ambulatory and able to carry out work of a light or sedentary nature] : Status 1- Restricted in physically strenuous activity but ambulatory and able to carry out work of a light or sedentary nature, e.g., light house work, office work [Normal] : affect appropriate [de-identified] : No icterus. EOMI [de-identified] : Supple No LAD  [de-identified] : MMM. O/P clear. No mouth sores.  [de-identified] : CTAB [de-identified] : S1 S2 [de-identified] : Trace pitting edema bilateral ankles [de-identified] : No spine/CVA tenderness  [de-identified] : No rash or skin lesions noted.  [de-identified] : Ambulates with cane, utilizes wheelchair for longer distances

## 2025-06-19 NOTE — HISTORY OF PRESENT ILLNESS
[Disease: _____________________] : Disease: [unfilled] [AJCC Stage: ____] : AJCC Stage: [unfilled] [de-identified] : 68F with metastatic NSCLC with adenocarcinoma histology with activating EGFR mutation.  In 2019, she was having eye surgery for a possible retinal detachment and a choroid lesion was noted with biopsy revealing malignancy.  She was sent to an ophthalmologist/oncologist (Dr. Kendall) who sent the patient for PET/CT in February 2019 revealing evidence of a left lower lobe lung primary tumor with intrathoracic lymph node involvement and evidence of bony metastatic disease.  Biopsy confirmed NSCLC with adenocarcinoma histology.  Peripheral blood CT-DNA testing revealed an activating EGFR mutation (exon 19 deletion).  She started osimertinib in March 2019.  She had transient left arm pain and swelling after starting the medication with negative Dopplers and a mild elevation of CK.  Medication was temporarily dose reduced to 40 mg before resuming full dose without recurrence of symptoms.  She responded to the medication.  Restaging CT in November 2020 revealed disease progression in the LLL primary tumor and a small left effusion.  She underwent VATS left lower lobectomy in December 2020 with pathology revealing a 2.1 cm adenocarcinoma with negative lymph nodes; molecular testing revealed her known EGFR exon 19 deletion mutation.  She continued on osimertinib.  A restaging PET/CT in May 2022 revealed findings concerning for disease progression, particularly in the left lung.  Follow-up CT chest in August 2022 revealed findings concerning for disease progression in the lungs and pleura.  She transferred care to [facility] in Sept 2022.  Peripheral blood ct-DNA testing from Sept 2022 was negative for any resistance mutations and just revealed her known EGFR mutation.   Began 2nd line Carbo/Pemetrexed in September 2022 with overall stable disease.  Completed 4 cycles of platinum doublet chemotherapy in mid November 2022 followed by pemetrexed maintenance as of early December 2022.  Developed disease progression in October 2024.  Therapy with amivantamab deferred due to concerns about ability to work throughout treatment. She started third-line gemcitabine on 11/7/24; achieved nice [place].  Right Mediport placed May 2025.  [de-identified] : Guardant ct-DNA 9/7/22 with EGFR exon 19 deletion, STK11;APC.  [de-identified] : Patient started third-line gemcitabine on 11/7/24 (3 weeks on, 1 week off); achieved [place]. Restaging PET/CT 3/19/2025 with deeper response. Patient presents prior to C9 D1 of gemcitabine. Right-sided Mediport was placed on 5/30/2025. Reports LE edema that waxes and wanes.  Takes Lasix and utilizes supportive care measures like leg elevation and compression stockings. Has HAWKINS.  Denies recent illness.  Restaging PET/CT with overall sustained response; there is note of nonspecific right lung opacities that may be inflammatory.

## 2025-06-19 NOTE — RESULTS/DATA
[FreeTextEntry1] : - PET/CT 5/11/2022: FDG avid nodular pleural thickening in the left lung base concerning for metastatic disease.  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  Additional 2-3 mm nodules in the right lung.  Persistent focal hypermetabolic activity in the anus.  - CT chest 8/10/2022: Overall findings are worrisome for worsening metastatic disease in both lungs and pleura.  -CT Chest 11/4/22:  Since 8/10/2022:  Unchanged irregular and nodular interlobular septal thickening in the posterior aspect of the left upper lobe and bilateral sub-5 mm lung nodules (and new compared to 2/20/2022).  Increased size of multiple skeletal sclerotic lesions.  -MRI Brain 11/6/22:  No acute intracranial hemorrhage, acute infarction, extra-axial fluid collection or hydrocephalus.  No enhancing intracranial lesion identified to suggest intracranial metastases.    --CT Chest 2/3/23: Decreased small loculated left pleural effusion with resolution of left pleural gas. Numerous stable sub-4 mm nodules in both lungs. No new or enlarging lung nodule. Increasing sclerosis of numerous bone metastasis.  -CT Chest 4/28/23:  In comparison with 2/3/2023, new nodular groundglass opacities throughout the right lung.  Stable micronodules along the pleural and fissural surfaces.  Left lower lobectomy with stable postsurgical changes. Stable septal thickening throughout the left upper lobe and right lower lobe. Stable left pleural thickening. Stable trace left pleural effusion.  Stable metastatic sclerotic bone lesions.  -CT Chest 6/28/23:  Compared to the previous examination, no significant change is noted.  -CT Chest 9/18/23:  Numerous bilateral 1-2 mm pulmonary nodules are unchanged since June 28, 2023.  Stable osseous metastases.  -CT Chest 12/27/23:  Increased 5 mm right perifissural nodule since September 2023. Other numerous small bilateral pulmonary nodules are unchanged.  Osteosclerotic metastases with increased sclerosis of the left anterior fourth rib.  Stable left pleural thickening and lower lobe septal thickening (?lymphangitic carcinomatosis).   -CT Chest 3/27/24: Previously described increased 5 mm right perifissural nodule has resolved, and may had represented area of atelectasis. No new or enlarging pulmonary nodule. Post left lower lobectomy. Osteosclerotic metastases, unchanged.  -CT Chest 6/18/24:  Interval resolution of previously seen posterior right lower lobe peribronchovascular groundglass opacities, which were likely infectious/inflammatory. Otherwise stable exam status post left lower lobectomy with pleural and septal thickening, numerous bilateral small nodules, and osteosclerotic metastases.  -CT chest 10/2/24: A few right mid to lower lung ill-defined groundglass nodular opacity opacities new since June 18, 2024. Differential diagnostic considerations include infectious or inflammatory etiologies. A 1-3 month follow-up noncontrast chest CT is recommended for further evaluation. Bilateral pulmonary nodules appear unchanged. Bony metastatic disease.  -PET/CT 10/23/24:  Abnormal skull-to-thigh FDG-PET/CT scan. 1. Nonspecific mildly FDG-avid patchy opacity in the right lower lobe is increased since 10/2/2024, and is new as compared to prior PET/CT dated 5/11/2022. Correlate clinically for infection. A 1 month follow-up with dedicated CT of chest may be obtained for further characterization. 2. Multiple FDG-avid sclerotic and difficult to delineate osseous metastases in the axial and appendicular skeleton are new as compared to prior PET/CT. 3. A moderate FDG-avid mid thoracic compression fracture/deformity is similar in appearance as compared to recent CT and demonstrates increased loss of height and new FDG avidity as compared to prior PET/CT.  -MRI Brain 12/11/24:  No evidence of intracranial metastasis. No acute intracranial hemorrhage or acute infarct. Mild chronic microvascular ischemic changes  - PET/CT 12/18/24:  Compared to FDG-PET/CT scan dated 10/23/2024:  1. Interval response to therapy in numerous FDG avid osseous lesions, which are decreased in FDG avidity. Residual disease is evident.  2. Near resolution of patchy right lower lobe groundglass opacities, with residual right basilar FDG avid groundglass opacity. This likely represents resolving infection.  3. Similar FDG avid left thyroid nodule. Recommend correlation with ultrasound to evaluate for signs of malignancy if not previously performed.  4. FDG avid left femoral head lesion with mild cortical thinning may place patient at risk of future pathologic fracture. No definitive fracture on today's exam.  -PET/CT 3/19/25:  Compared to 12/18/2024 FDG PET/CT:  1.Interval continued response to therapy in numerous FDG avid osseous lesions, many which have continued to have further decreased FDG activity. Residual neoplastic disease is still evident. No new FDG avid osseous lesion.  2. Mild right patchy ground glass opacities with residual mild activity similar to prior.  3. Similar FDG avid left thyroid nodule, consider correlation with thyroid ultrasound to evaluate for malignancy if not already performed.  4. Resolution of previously noted hypermetabolism associated with the femoral lesion; this is otherwise stable appearance on CT with mild cortical thinning. This may place patient at risk for future pathologic fracture.  5. New/increased activity within the rectal region, without definite corresponding abnormality on CT. This may be inflammatory and may be assessed clinically. Otherwise no new abnormal FDG uptake elsewhere.  Images Reviewed/Interpreted:  -PET/CT 6/12/25:  Compared to FDG-PET/CT scan dated 3/19/2015: 1. Nonspecific few new FDG avid ill-defined opacities in medial aspect of right upper lobe and right subcarinal region. Correlate clinically for infection. A 1 month follow-up with dedicated CT of chest may be obtained for further characterization. 2. New small difficult to delineate mildly FDG avid left supraclavicular node, nonspecific. 3. Subcutaneous mild FDG uptake in Mediport insertion site, likely inflammatory. Correlate clinically. 4. Similar FDG avid left thyroid nodule, consider correlation with thyroid ultrasound to evaluate for malignancy if not already performed. 5. Persistent focal intense FDG avidity within the rectal region, unchanged, may be inflammatory. If clinically indicated correlate with colonoscopy.

## 2025-06-19 NOTE — HISTORY OF PRESENT ILLNESS
[Disease: _____________________] : Disease: [unfilled] [AJCC Stage: ____] : AJCC Stage: [unfilled] [de-identified] : 68F with metastatic NSCLC with adenocarcinoma histology with activating EGFR mutation.  In 2019, she was having eye surgery for a possible retinal detachment and a choroid lesion was noted with biopsy revealing malignancy.  She was sent to an ophthalmologist/oncologist (Dr. Kendall) who sent the patient for PET/CT in February 2019 revealing evidence of a left lower lobe lung primary tumor with intrathoracic lymph node involvement and evidence of bony metastatic disease.  Biopsy confirmed NSCLC with adenocarcinoma histology.  Peripheral blood CT-DNA testing revealed an activating EGFR mutation (exon 19 deletion).  She started osimertinib in March 2019.  She had transient left arm pain and swelling after starting the medication with negative Dopplers and a mild elevation of CK.  Medication was temporarily dose reduced to 40 mg before resuming full dose without recurrence of symptoms.  She responded to the medication.  Restaging CT in November 2020 revealed disease progression in the LLL primary tumor and a small left effusion.  She underwent VATS left lower lobectomy in December 2020 with pathology revealing a 2.1 cm adenocarcinoma with negative lymph nodes; molecular testing revealed her known EGFR exon 19 deletion mutation.  She continued on osimertinib.  A restaging PET/CT in May 2022 revealed findings concerning for disease progression, particularly in the left lung.  Follow-up CT chest in August 2022 revealed findings concerning for disease progression in the lungs and pleura.  She transferred care to [facility] in Sept 2022.  Peripheral blood ct-DNA testing from Sept 2022 was negative for any resistance mutations and just revealed her known EGFR mutation.   Began 2nd line Carbo/Pemetrexed in September 2022 with overall stable disease.  Completed 4 cycles of platinum doublet chemotherapy in mid November 2022 followed by pemetrexed maintenance as of early December 2022.  Developed disease progression in October 2024.  Therapy with amivantamab deferred due to concerns about ability to work throughout treatment. She started third-line gemcitabine on 11/7/24; achieved nice [place].  Right Mediport placed May 2025.  [de-identified] : Guardant ct-DNA 9/7/22 with EGFR exon 19 deletion, STK11;APC.  [de-identified] : Patient started third-line gemcitabine on 11/7/24 (3 weeks on, 1 week off); achieved [place]. Restaging PET/CT 3/19/2025 with deeper response. Patient presents prior to C9 D1 of gemcitabine. Right-sided Mediport was placed on 5/30/2025. Reports LE edema that waxes and wanes.  Takes Lasix and utilizes supportive care measures like leg elevation and compression stockings. Has HAWKINS.  Denies recent illness.  Restaging PET/CT with overall sustained response; there is note of nonspecific right lung opacities that may be inflammatory.

## 2025-06-19 NOTE — ASSESSMENT
[Medication(s)] : Medication(s) [FreeTextEntry1] : NSCLC adenocarcinoma histology with activating EGFR mutation diagnosed in early 2019 treated with first-line osimertinib in March 2019. Developed oligometastatic disease progression in the primary tumor status post resection with left lower lobectomy in December 2020 and continued on osimertinib. Restaging CT from August 2022 with evidence of disease progression that does not appear amenable to local therapy. Started 2nd line chemotherapy with Carbo/Pemetrexed in Sept 2022 with overall stable disease. Completed 4 cycles of platinum doublet chemotherapy in mid-November 2022 followed by pemetrexed maintenance as of early December 2022. Restaging CT chest October 2024 with new right lung opacities suspected to be inflammatory with evidence of bony metastatic disease with increased sclerosis in the setting of increased back pain.  PET/CT Late Oct 2024 with evidence of disease progression. Started third-line gemcitabine on 11/7/24; achieved nice [place].   Restaging PET/CT 6/12/25 with deeper response.  Recommend: - Continue third-line gemcitabine 800mg/m2 IV weekly (3 weeks on, 1 week off) for as long as it benefits the patient. Starting Cycle 9 today.  - Repeat labs today. Can monitor periodic CEA.  - Anemia likely due to chemotherapy.  Monitor hemoglobin.  - Deferring Amivantamab for potential future line of therapy due to patient concerns of infusion time/ability to work.  - Neoplasm-related pain: Utilizing acetaminophen and tramadol PRN. Of note, reports allergy to oxycodone with pruritus as a reaction; this was likely a side effect and not a true allergy. -Saw Orthopedic Oncology evaluation of bony findings:  No surgical intervention recommended -Saw Rad-Onc in consultation for possible palliative RT to painful bony metastases; patient declined RT - Thyroid nodule: Patient declined to pursue thyroid ultrasound. - - Patient declined to see pulmonary for evaluation of dyspnea - Declined addition of a bone modifying medication to decrease risk for skeletal related event.  - Bilateral ankle/foot edema. Mild currently. Likely from gemcitabine. Continue leg elevation, compression socks. Utilizing dexamethasone 4mg by mouth daily x 3 days after each gemcitabine infusion which pt states helps. Continue furosemide 20mg by mouth daily if needed. - Right mediport in place - Continue follow-up on day 1 of each cycle.   - Plan on obtaining her next restaging scan in September prior to MD visit that month. Checkout form provided to schedule through September.

## 2025-06-19 NOTE — ASSESSMENT
[Medication(s)] : Medication(s) [FreeTextEntry1] : NSCLC adenocarcinoma histology with activating EGFR mutation diagnosed in early 2019 treated with first-line osimertinib in March 2019. Developed oligometastatic disease progression in the primary tumor status post resection with left lower lobectomy in December 2020 and continued on osimertinib. Restaging CT from August 2022 with evidence of disease progression that does not appear amenable to local therapy. Started 2nd line chemotherapy with Carbo/Pemetrexed in Sept 2022 with overall stable disease. Completed 4 cycles of platinum doublet chemotherapy in mid-November 2022 followed by pemetrexed maintenance as of early December 2022. Restaging CT chest October 2024 with new right lung opacities suspected to be inflammatory with evidence of bony metastatic disease with increased sclerosis in the setting of increased back pain.  PET/CT Late Oct 2024 with evidence of disease progression. Started third-line gemcitabine on 11/7/24; achieved nice [place].   Restaging PET/CT 6/12/25 with deeper response.  Recommend: - Continue third-line gemcitabine 800mg/m2 IV weekly (3 weeks on, 1 week off) for as long as it benefits the patient. Starting Cycle 9 today.  - Repeat labs today. Can monitor periodic CEA.  - Anemia likely due to chemotherapy.  Monitor hemoglobin.  - Deferring Amivantamab for potential future line of therapy due to patient concerns of infusion time/ability to work.  - Neoplasm-related pain: Utilizing acetaminophen and tramadol PRN. Of note, reports allergy to oxycodone with pruritus as a reaction; this was likely a side effect and not a true allergy. -Saw Orthopedic Oncology evaluation of bony findings:  No surgical intervention recommended -Saw Rad-Onc in consultation for possible palliative RT to painful bony metastases; patient declined RT - Thyroid nodule: Patient declined to pursue thyroid ultrasound. - - Patient declined to see pulmonary for evaluation of dyspnea - Declined addition of a bone modifying medication to decrease risk for skeletal related event.  - Bilateral ankle/foot edema. Mild currently. Likely from gemcitabine. Continue leg elevation, compression socks. Utilizing dexamethasone 4mg by mouth daily x 3 days after each gemcitabine infusion which pt states helps. Continue furosemide 20mg by mouth daily if needed. - Right mediport in place - Continue follow-up on day 1 of each cycle.   - Plan on obtaining her next restaging scan in September prior to MD visit that month. Checkout form provided to schedule through September.

## 2025-07-15 NOTE — PHYSICAL EXAM
[Restricted in physically strenuous activity but ambulatory and able to carry out work of a light or sedentary nature] : Status 1- Restricted in physically strenuous activity but ambulatory and able to carry out work of a light or sedentary nature, e.g., light house work, office work [Normal] : affect appropriate [de-identified] : No icterus. EOMI [de-identified] : MMM. O/P clear. No mouth sores.  [de-identified] : Supple No LAD  [de-identified] : CTAB [de-identified] : S1 S2 [de-identified] : Trace pitting edema bilateral ankles [de-identified] : No spine/CVA tenderness  [de-identified] : Ambulates with cane, utilizes wheelchair for longer distances [de-identified] : No rash or skin lesions noted.

## 2025-07-15 NOTE — HISTORY OF PRESENT ILLNESS
[Disease: _____________________] : Disease: [unfilled] [AJCC Stage: ____] : AJCC Stage: [unfilled] [de-identified] : 68F with metastatic NSCLC with adenocarcinoma histology with activating EGFR mutation.  In 2019, she was having eye surgery for a possible retinal detachment and a choroid lesion was noted with biopsy revealing malignancy.  She was sent to an ophthalmologist/oncologist (Dr. Kendall) who sent the patient for PET/CT in February 2019 revealing evidence of a left lower lobe lung primary tumor with intrathoracic lymph node involvement and evidence of bony metastatic disease.  Biopsy confirmed NSCLC with adenocarcinoma histology.  Peripheral blood CT-DNA testing revealed an activating EGFR mutation (exon 19 deletion).  She started osimertinib in March 2019.  She had transient left arm pain and swelling after starting the medication with negative Dopplers and a mild elevation of CK.  Medication was temporarily dose reduced to 40 mg before resuming full dose without recurrence of symptoms.  She responded to the medication.  Restaging CT in November 2020 revealed disease progression in the LLL primary tumor and a small left effusion.  She underwent VATS left lower lobectomy in December 2020 with pathology revealing a 2.1 cm adenocarcinoma with negative lymph nodes; molecular testing revealed her known EGFR exon 19 deletion mutation.  She continued on osimertinib.  A restaging PET/CT in May 2022 revealed findings concerning for disease progression, particularly in the left lung.  Follow-up CT chest in August 2022 revealed findings concerning for disease progression in the lungs and pleura.  She transferred care to [facility] in Sept 2022.  Peripheral blood ct-DNA testing from Sept 2022 was negative for any resistance mutations and just revealed her known EGFR mutation.   Began 2nd line Carbo/Pemetrexed in September 2022 with overall stable disease.  Completed 4 cycles of platinum doublet chemotherapy in mid November 2022 followed by pemetrexed maintenance as of early December 2022.  Developed disease progression in October 2024.  Therapy with amivantamab deferred due to concerns about ability to work throughout treatment. She started third-line gemcitabine on 11/7/24; achieved nice [place].  Right Mediport placed May 2025.  [de-identified] : Guardant ct-DNA 9/7/22 with EGFR exon 19 deletion, STK11;APC.  [de-identified] : Patient started third-line gemcitabine on 11/7/24 (3 weeks on, 1 week off); achieved [place]. Restaging PET/CT 3/19/2025 with deeper response. Patient presents prior to C10 D1 of gemcitabine. Right-sided Mediport was placed on 5/30/2025.   Reports LE edema that waxes and wanes.  Takes Lasix and utilizes supportive care measures like leg elevation and compression stockings. Has HAWKINS.  Denies recent illness.  Restaging PET/CT with overall sustained response; there is note of nonspecific right lung opacities that may be inflammatory.

## 2025-07-15 NOTE — ASSESSMENT
[FreeTextEntry1] : NSCLC adenocarcinoma histology with activating EGFR mutation diagnosed in early 2019 treated with first-line osimertinib in March 2019. Developed oligometastatic disease progression in the primary tumor status post resection with left lower lobectomy in December 2020 and continued on osimertinib. Restaging CT from August 2022 with evidence of disease progression that does not appear amenable to local therapy. Started 2nd line chemotherapy with Carbo/Pemetrexed in Sept 2022 with overall stable disease. Completed 4 cycles of platinum doublet chemotherapy in mid-November 2022 followed by pemetrexed maintenance as of early December 2022. Restaging CT chest October 2024 with new right lung opacities suspected to be inflammatory with evidence of bony metastatic disease with increased sclerosis in the setting of increased back pain.  PET/CT Late Oct 2024 with evidence of disease progression. Started third-line gemcitabine on 11/7/24; achieved nice [place].   Restaging PET/CT 6/12/25 with deeper response.  Recommend: - Continue third-line gemcitabine 800mg/m2 IV weekly (3 weeks on, 1 week off) for as long as it benefits the patient. Starting Cycle 10 today.  - Repeat labs today. Can monitor periodic CEA.  - Anemia likely due to chemotherapy.  Monitor hemoglobin.  - Deferring Amivantamab for potential future line of therapy due to patient concerns of infusion time/ability to work.  - Neoplasm-related pain: Utilizing acetaminophen and tramadol PRN. Of note, reports allergy to oxycodone with pruritus as a reaction; this was likely a side effect and not a true allergy. -Saw Orthopedic Oncology evaluation of bony findings:  No surgical intervention recommended -Saw Rad-Onc in consultation for possible palliative RT to painful bony metastases; patient declined RT - Thyroid nodule: Patient declined to pursue thyroid ultrasound. - Patient declined to see pulmonary for evaluation of dyspnea - Declined addition of a bone modifying medication to decrease risk for skeletal related event.  - Bilateral ankle/foot edema. Mild currently. Likely from gemcitabine. Continue leg elevation, compression socks. Utilizing dexamethasone 4mg by mouth daily x 3 days after each gemcitabine infusion which pt states helps. Continue furosemide 20mg by mouth daily if needed. - Right mediport in place - Continue follow-up on day 1 of each cycle.   - Plan on obtaining her next restaging scan in September prior to MD visit that month. Scheduled through September. [Medication(s)] : Medication(s)

## 2025-07-16 NOTE — HISTORY OF PRESENT ILLNESS
[de-identified] : 68F with metastatic NSCLC with adenocarcinoma histology with activating EGFR mutation.  In 2019, she was having eye surgery for a possible retinal detachment and a choroid lesion was noted with biopsy revealing malignancy.  She was sent to an ophthalmologist/oncologist (Dr. Kendall) who sent the patient for PET/CT in February 2019 revealing evidence of a left lower lobe lung primary tumor with intrathoracic lymph node involvement and evidence of bony metastatic disease.  Biopsy confirmed NSCLC with adenocarcinoma histology.  Peripheral blood CT-DNA testing revealed an activating EGFR mutation (exon 19 deletion).  She started osimertinib in March 2019.  She had transient left arm pain and swelling after starting the medication with negative Dopplers and a mild elevation of CK.  Medication was temporarily dose reduced to 40 mg before resuming full dose without recurrence of symptoms.  She responded to the medication.  Restaging CT in November 2020 revealed disease progression in the LLL primary tumor and a small left effusion.  She underwent VATS left lower lobectomy in December 2020 with pathology revealing a 2.1 cm adenocarcinoma with negative lymph nodes; molecular testing revealed her known EGFR exon 19 deletion mutation.  She continued on osimertinib.  A restaging PET/CT in May 2022 revealed findings concerning for disease progression, particularly in the left lung.  Follow-up CT chest in August 2022 revealed findings concerning for disease progression in the lungs and pleura.  She transferred care to [facility] in Sept 2022.  Peripheral blood ct-DNA testing from Sept 2022 was negative for any resistance mutations and just revealed her known EGFR mutation.   Began 2nd line Carbo/Pemetrexed in September 2022 with overall stable disease.  Completed 4 cycles of platinum doublet chemotherapy in mid November 2022 followed by pemetrexed maintenance as of early December 2022.  Developed disease progression in October 2024.  Therapy with amivantamab deferred due to concerns about ability to work throughout treatment. She started third-line gemcitabine on 11/7/24; achieved nice [place].  Right Mediport placed May 2025.  [de-identified] : Guardant ct-DNA 9/7/22 with EGFR exon 19 deletion, STK11;APC.  [de-identified] : Patient started third-line gemcitabine on 11/7/24 (3 weeks on, 1 week off); achieved [place]. Restaging PET/CT 3/19/2025 with deeper response. Right-sided Mediport was placed on 5/30/2025. Patient presents prior to C10 D1 of gemcitabine. Reports bothersome leg swelling, she notes left is more than right and there is some pain to the left leg; pt concerned about clot.  Has not been using furosemide lately.  Reports dyspnea on exertion is chronic but feels that it may be gradually worsening.  Denies F/C, N/V, hemoptysis, lumps or bumps, dysuria, constipation, diarrhea, or rash.   Restaging PET/CT with overall sustained response; there is note of nonspecific right lung opacities that may be inflammatory.

## 2025-07-16 NOTE — ASSESSMENT
[FreeTextEntry1] : NSCLC adenocarcinoma histology with activating EGFR mutation diagnosed in early 2019 treated with first-line osimertinib in March 2019. Developed oligometastatic disease progression in the primary tumor status post resection with left lower lobectomy in December 2020 and continued on osimertinib. Restaging CT from August 2022 with evidence of disease progression that does not appear amenable to local therapy. Started 2nd line chemotherapy with Carbo/Pemetrexed in Sept 2022 with overall stable disease. Completed 4 cycles of platinum doublet chemotherapy in mid-November 2022 followed by pemetrexed maintenance as of early December 2022. Restaging CT chest October 2024 with new right lung opacities suspected to be inflammatory with evidence of bony metastatic disease with increased sclerosis in the setting of increased back pain.  PET/CT Late Oct 2024 with evidence of disease progression. Started third-line gemcitabine on 11/7/24; achieved nice [place].   Restaging PET/CT 6/12/25 with deeper response.  Recommend: - Continue third-line gemcitabine 800mg/m2 IV weekly (3 weeks on, 1 week off) for as long as it benefits the patient. Starting Cycle 10 today.  - Repeat labs today. Can monitor periodic CEA.  - Right mediport in place.  - Leg swelling. L>R, reports pain. Will request bilateral leg venous duplex to r/o DVT. If no DVT, likely gemcitabine-related edema for which she will continue dexamethasone 4mg by mouth daily x 3 days after each infusion, and use furosemide 20mg once a day PRN.  - Anemia likely due to chemotherapy.  Monitor hemoglobin.  - Deferring Amivantamab for potential future line of therapy due to patient concerns of infusion time/ability to work.  - Neoplasm-related pain: Utilizing acetaminophen and tramadol PRN. Of note, reports allergy to oxycodone with pruritus as a reaction; this was likely a side effect and not a true allergy. -Saw Orthopedic Oncology evaluation of bony findings:  No surgical intervention recommended -Saw Rad-Onc in consultation for possible palliative RT to painful bony metastases; patient declined RT - Thyroid nodule: Patient declined to pursue thyroid ultrasound. - Patient declined to see pulmonary for evaluation of dyspnea - Declined addition of a bone modifying medication to decrease risk for skeletal related event.  - Continue follow-up on day 1 of each cycle.   - Plan on obtaining her next restaging scan in September prior to MD visit that month. Scheduled through September.

## 2025-07-16 NOTE — RESULTS/DATA
[FreeTextEntry1] : - PET/CT 5/11/2022: FDG avid nodular pleural thickening in the left lung base concerning for metastatic disease.  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  Additional 2-3 mm nodules in the right lung.  Persistent focal hypermetabolic activity in the anus.  - CT chest 8/10/2022: Overall findings are worrisome for worsening metastatic disease in both lungs and pleura.  -CT Chest 11/4/22:  Since 8/10/2022:  Unchanged irregular and nodular interlobular septal thickening in the posterior aspect of the left upper lobe and bilateral sub-5 mm lung nodules (and new compared to 2/20/2022).  Increased size of multiple skeletal sclerotic lesions.  -MRI Brain 11/6/22:  No acute intracranial hemorrhage, acute infarction, extra-axial fluid collection or hydrocephalus.  No enhancing intracranial lesion identified to suggest intracranial metastases.    --CT Chest 2/3/23: Decreased small loculated left pleural effusion with resolution of left pleural gas. Numerous stable sub-4 mm nodules in both lungs. No new or enlarging lung nodule. Increasing sclerosis of numerous bone metastasis.  -CT Chest 4/28/23:  In comparison with 2/3/2023, new nodular groundglass opacities throughout the right lung.  Stable micronodules along the pleural and fissural surfaces.  Left lower lobectomy with stable postsurgical changes. Stable septal thickening throughout the left upper lobe and right lower lobe. Stable left pleural thickening. Stable trace left pleural effusion.  Stable metastatic sclerotic bone lesions.  -CT Chest 6/28/23:  Compared to the previous examination, no significant change is noted.  -CT Chest 9/18/23:  Numerous bilateral 1-2 mm pulmonary nodules are unchanged since June 28, 2023.  Stable osseous metastases.  -CT Chest 12/27/23:  Increased 5 mm right perifissural nodule since September 2023. Other numerous small bilateral pulmonary nodules are unchanged.  Osteosclerotic metastases with increased sclerosis of the left anterior fourth rib.  Stable left pleural thickening and lower lobe septal thickening (?lymphangitic carcinomatosis).   -CT Chest 3/27/24: Previously described increased 5 mm right perifissural nodule has resolved, and may had represented area of atelectasis. No new or enlarging pulmonary nodule. Post left lower lobectomy. Osteosclerotic metastases, unchanged.  -CT Chest 6/18/24:  Interval resolution of previously seen posterior right lower lobe peribronchovascular groundglass opacities, which were likely infectious/inflammatory. Otherwise stable exam status post left lower lobectomy with pleural and septal thickening, numerous bilateral small nodules, and osteosclerotic metastases.  -CT chest 10/2/24: A few right mid to lower lung ill-defined groundglass nodular opacity opacities new since June 18, 2024. Differential diagnostic considerations include infectious or inflammatory etiologies. A 1-3 month follow-up noncontrast chest CT is recommended for further evaluation. Bilateral pulmonary nodules appear unchanged. Bony metastatic disease.  -PET/CT 10/23/24:  Abnormal skull-to-thigh FDG-PET/CT scan. 1. Nonspecific mildly FDG-avid patchy opacity in the right lower lobe is increased since 10/2/2024, and is new as compared to prior PET/CT dated 5/11/2022. Correlate clinically for infection. A 1 month follow-up with dedicated CT of chest may be obtained for further characterization. 2. Multiple FDG-avid sclerotic and difficult to delineate osseous metastases in the axial and appendicular skeleton are new as compared to prior PET/CT. 3. A moderate FDG-avid mid thoracic compression fracture/deformity is similar in appearance as compared to recent CT and demonstrates increased loss of height and new FDG avidity as compared to prior PET/CT.  -MRI Brain 12/11/24:  No evidence of intracranial metastasis. No acute intracranial hemorrhage or acute infarct. Mild chronic microvascular ischemic changes  - PET/CT 12/18/24:  Compared to FDG-PET/CT scan dated 10/23/2024:  1. Interval response to therapy in numerous FDG avid osseous lesions, which are decreased in FDG avidity. Residual disease is evident.  2. Near resolution of patchy right lower lobe groundglass opacities, with residual right basilar FDG avid groundglass opacity. This likely represents resolving infection.  3. Similar FDG avid left thyroid nodule. Recommend correlation with ultrasound to evaluate for signs of malignancy if not previously performed.  4. FDG avid left femoral head lesion with mild cortical thinning may place patient at risk of future pathologic fracture. No definitive fracture on today's exam.  -PET/CT 3/19/25:  Compared to 12/18/2024 FDG PET/CT:  1.Interval continued response to therapy in numerous FDG avid osseous lesions, many which have continued to have further decreased FDG activity. Residual neoplastic disease is still evident. No new FDG avid osseous lesion.  2. Mild right patchy ground glass opacities with residual mild activity similar to prior.  3. Similar FDG avid left thyroid nodule, consider correlation with thyroid ultrasound to evaluate for malignancy if not already performed.  4. Resolution of previously noted hypermetabolism associated with the femoral lesion; this is otherwise stable appearance on CT with mild cortical thinning. This may place patient at risk for future pathologic fracture.  5. New/increased activity within the rectal region, without definite corresponding abnormality on CT. This may be inflammatory and may be assessed clinically. Otherwise no new abnormal FDG uptake elsewhere.  -PET/CT 6/12/25:  Compared to FDG-PET/CT scan dated 3/19/2015: 1. Nonspecific few new FDG avid ill-defined opacities in medial aspect of right upper lobe and right subcarinal region. Correlate clinically for infection. A 1 month follow-up with dedicated CT of chest may be obtained for further characterization. 2. New small difficult to delineate mildly FDG avid left supraclavicular node, nonspecific. 3. Subcutaneous mild FDG uptake in Mediport insertion site, likely inflammatory. Correlate clinically. 4. Similar FDG avid left thyroid nodule, consider correlation with thyroid ultrasound to evaluate for malignancy if not already performed. 5. Persistent focal intense FDG avidity within the rectal region, unchanged, may be inflammatory. If clinically indicated correlate with colonoscopy.

## 2025-07-16 NOTE — PHYSICAL EXAM
[de-identified] : No icterus. EOMI [de-identified] : MMM. O/P clear. No mouth sores.  [de-identified] : Supple No LAD  [de-identified] : CTAB [de-identified] : S1 S2 [de-identified] : Bilateral lower leg edema, L>R. No erythema noted.  [de-identified] : No spine/CVA tenderness  [de-identified] : Ambulates with cane, utilizes wheelchair for longer distances [de-identified] : No rash or skin lesions noted.

## 2025-07-23 NOTE — ASSESSMENT
[FreeTextEntry1] : NSCLC adenocarcinoma histology with activating EGFR mutation diagnosed in early 2019 treated with first-line osimertinib in March 2019. Developed oligometastatic disease progression in the primary tumor status post resection with left lower lobectomy in December 2020 and continued on osimertinib. Restaging CT from August 2022 with evidence of disease progression that does not appear amenable to local therapy. Started 2nd line chemotherapy with Carbo/Pemetrexed in Sept 2022 with overall stable disease. Completed 4 cycles of platinum doublet chemotherapy in mid-November 2022 followed by pemetrexed maintenance as of early December 2022. Restaging CT chest October 2024 with new right lung opacities suspected to be inflammatory with evidence of bony metastatic disease with increased sclerosis in the setting of increased back pain.  PET/CT Late Oct 2024 with evidence of disease progression. Started third-line gemcitabine on 11/7/24; achieved nice [place].   Restaging PET/CT 6/12/25 with deeper response.  Recommend: - Continue third-line gemcitabine 800mg/m2 IV weekly (3 weeks on, 1 week off) for as long as it benefits the patient. Cycle 10 Day 8 today.  - Repeat labs today. Can monitor periodic CEA.  - Right mediport in place.  - Leg swelling. L>R, reports pain. Will request bilateral leg venous duplex to r/o DVT. If no DVT, likely gemcitabine-related edema for which she will continue dexamethasone 4mg by mouth daily x 3 days after each infusion, and use furosemide 20mg once a day PRN.  - Anemia likely due to chemotherapy.  Monitor hemoglobin.  - Deferring Amivantamab for potential future line of therapy due to patient concerns of infusion time/ability to work.  - Neoplasm-related pain: Utilizing acetaminophen and tramadol PRN. Of note, reports allergy to oxycodone with pruritus as a reaction; this was likely a side effect and not a true allergy. -Saw Orthopedic Oncology evaluation of bony findings:  No surgical intervention recommended -Saw Rad-Onc in consultation for possible palliative RT to painful bony metastases; patient declined RT - Thyroid nodule: Patient declined to pursue thyroid ultrasound. - Patient declined to see pulmonary for evaluation of dyspnea - Declined addition of a bone modifying medication to decrease risk for skeletal related event.  - Continue follow-up on day 1 of each cycle.   - Plan on obtaining her next restaging scan in September prior to MD visit that month. Scheduled through September. [Future Reassessment of Pain Scale] : Future reassessment of pain scale

## 2025-07-23 NOTE — PHYSICAL EXAM
[Restricted in physically strenuous activity but ambulatory and able to carry out work of a light or sedentary nature] : Status 1- Restricted in physically strenuous activity but ambulatory and able to carry out work of a light or sedentary nature, e.g., light house work, office work [Normal] : affect appropriate [de-identified] : No icterus. EOMI [de-identified] : MMM. O/P clear. No mouth sores.  [de-identified] : Supple No LAD  [de-identified] : CTAB [de-identified] : S1 S2 [de-identified] : Bilateral lower leg edema, L>R. No erythema noted.  [de-identified] : No spine/CVA tenderness  [de-identified] : Ambulates with cane, utilizes wheelchair for longer distances [de-identified] : No rash or skin lesions noted.

## 2025-07-23 NOTE — RESULTS/DATA
[FreeTextEntry1] : - PET/CT 5/11/2022: FDG avid nodular pleural thickening in the left lung base concerning for metastatic disease.  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  Additional 2-3 mm nodules in the right lung.  Persistent focal hypermetabolic activity in the anus.  - CT chest 8/10/2022: Overall findings are worrisome for worsening metastatic disease in both lungs and pleura.  -CT Chest 11/4/22:  Since 8/10/2022:  Unchanged irregular and nodular interlobular septal thickening in the posterior aspect of the left upper lobe and bilateral sub-5 mm lung nodules (and new compared to 2/20/2022).  Increased size of multiple skeletal sclerotic lesions.  -MRI Brain 11/6/22:  No acute intracranial hemorrhage, acute infarction, extra-axial fluid collection or hydrocephalus.  No enhancing intracranial lesion identified to suggest intracranial metastases.    --CT Chest 2/3/23: Decreased small loculated left pleural effusion with resolution of left pleural gas. Numerous stable sub-4 mm nodules in both lungs. No new or enlarging lung nodule. Increasing sclerosis of numerous bone metastasis.  -CT Chest 4/28/23:  In comparison with 2/3/2023, new nodular groundglass opacities throughout the right lung.  Stable micronodules along the pleural and fissural surfaces.  Left lower lobectomy with stable postsurgical changes. Stable septal thickening throughout the left upper lobe and right lower lobe. Stable left pleural thickening. Stable trace left pleural effusion.  Stable metastatic sclerotic bone lesions.  -CT Chest 6/28/23:  Compared to the previous examination, no significant change is noted.  -CT Chest 9/18/23:  Numerous bilateral 1-2 mm pulmonary nodules are unchanged since June 28, 2023.  Stable osseous metastases.  -CT Chest 12/27/23:  Increased 5 mm right perifissural nodule since September 2023. Other numerous small bilateral pulmonary nodules are unchanged.  Osteosclerotic metastases with increased sclerosis of the left anterior fourth rib.  Stable left pleural thickening and lower lobe septal thickening (?lymphangitic carcinomatosis).   -CT Chest 3/27/24: Previously described increased 5 mm right perifissural nodule has resolved, and may had represented area of atelectasis. No new or enlarging pulmonary nodule. Post left lower lobectomy. Osteosclerotic metastases, unchanged.  -CT Chest 6/18/24:  Interval resolution of previously seen posterior right lower lobe peribronchovascular groundglass opacities, which were likely infectious/inflammatory. Otherwise stable exam status post left lower lobectomy with pleural and septal thickening, numerous bilateral small nodules, and osteosclerotic metastases.  -CT chest 10/2/24: A few right mid to lower lung ill-defined groundglass nodular opacity opacities new since June 18, 2024. Differential diagnostic considerations include infectious or inflammatory etiologies. A 1-3 month follow-up noncontrast chest CT is recommended for further evaluation. Bilateral pulmonary nodules appear unchanged. Bony metastatic disease.  -PET/CT 10/23/24:  Abnormal skull-to-thigh FDG-PET/CT scan. 1. Nonspecific mildly FDG-avid patchy opacity in the right lower lobe is increased since 10/2/2024, and is new as compared to prior PET/CT dated 5/11/2022. Correlate clinically for infection. A 1 month follow-up with dedicated CT of chest may be obtained for further characterization. 2. Multiple FDG-avid sclerotic and difficult to delineate osseous metastases in the axial and appendicular skeleton are new as compared to prior PET/CT. 3. A moderate FDG-avid mid thoracic compression fracture/deformity is similar in appearance as compared to recent CT and demonstrates increased loss of height and new FDG avidity as compared to prior PET/CT.  -MRI Brain 12/11/24:  No evidence of intracranial metastasis. No acute intracranial hemorrhage or acute infarct. Mild chronic microvascular ischemic changes  - PET/CT 12/18/24:  Compared to FDG-PET/CT scan dated 10/23/2024:  1. Interval response to therapy in numerous FDG avid osseous lesions, which are decreased in FDG avidity. Residual disease is evident.  2. Near resolution of patchy right lower lobe groundglass opacities, with residual right basilar FDG avid groundglass opacity. This likely represents resolving infection.  3. Similar FDG avid left thyroid nodule. Recommend correlation with ultrasound to evaluate for signs of malignancy if not previously performed.  4. FDG avid left femoral head lesion with mild cortical thinning may place patient at risk of future pathologic fracture. No definitive fracture on today's exam.  -PET/CT 3/19/25:  Compared to 12/18/2024 FDG PET/CT:  1.Interval continued response to therapy in numerous FDG avid osseous lesions, many which have continued to have further decreased FDG activity. Residual neoplastic disease is still evident. No new FDG avid osseous lesion.  2. Mild right patchy ground glass opacities with residual mild activity similar to prior.  3. Similar FDG avid left thyroid nodule, consider correlation with thyroid ultrasound to evaluate for malignancy if not already performed.  4. Resolution of previously noted hypermetabolism associated with the femoral lesion; this is otherwise stable appearance on CT with mild cortical thinning. This may place patient at risk for future pathologic fracture.  5. New/increased activity within the rectal region, without definite corresponding abnormality on CT. This may be inflammatory and may be assessed clinically. Otherwise no new abnormal FDG uptake elsewhere.  -PET/CT 6/12/25:  Compared to FDG-PET/CT scan dated 3/19/2015: 1. Nonspecific few new FDG avid ill-defined opacities in medial aspect of right upper lobe and right subcarinal region. Correlate clinically for infection. A 1 month follow-up with dedicated CT of chest may be obtained for further characterization. 2. New small difficult to delineate mildly FDG avid left supraclavicular node, nonspecific. 3. Subcutaneous mild FDG uptake in Mediport insertion site, likely inflammatory. Correlate clinically. 4. Similar FDG avid left thyroid nodule, consider correlation with thyroid ultrasound to evaluate for malignancy if not already performed. 5. Persistent focal intense FDG avidity within the rectal region, unchanged, may be inflammatory. If clinically indicated correlate with colonoscopy.  - US Duplex B/L LE 7/20/25: Acute occlusive DVT involving the left popliteal vein with extension into the left calf veins.  - CT Angio Chest w/wo IV contrast 7/20/25: Bilateral pulmonary emboli.  No CT evidence of right heart strain. Dilated main pulmonary artery suggests pulmonary arterial hypertension. Grossly stable osseous metastases.

## 2025-07-23 NOTE — HISTORY OF PRESENT ILLNESS
[Disease: _____________________] : Disease: [unfilled] [AJCC Stage: ____] : AJCC Stage: [unfilled] [de-identified] : 68F with metastatic NSCLC with adenocarcinoma histology with activating EGFR mutation.  In 2019, she was having eye surgery for a possible retinal detachment and a choroid lesion was noted with biopsy revealing malignancy.  She was sent to an ophthalmologist/oncologist (Dr. Kendall) who sent the patient for PET/CT in February 2019 revealing evidence of a left lower lobe lung primary tumor with intrathoracic lymph node involvement and evidence of bony metastatic disease.  Biopsy confirmed NSCLC with adenocarcinoma histology.  Peripheral blood CT-DNA testing revealed an activating EGFR mutation (exon 19 deletion).  She started osimertinib in March 2019.  She had transient left arm pain and swelling after starting the medication with negative Dopplers and a mild elevation of CK.  Medication was temporarily dose reduced to 40 mg before resuming full dose without recurrence of symptoms.  She responded to the medication.  Restaging CT in November 2020 revealed disease progression in the LLL primary tumor and a small left effusion.  She underwent VATS left lower lobectomy in December 2020 with pathology revealing a 2.1 cm adenocarcinoma with negative lymph nodes; molecular testing revealed her known EGFR exon 19 deletion mutation.  She continued on osimertinib.  A restaging PET/CT in May 2022 revealed findings concerning for disease progression, particularly in the left lung.  Follow-up CT chest in August 2022 revealed findings concerning for disease progression in the lungs and pleura.  She transferred care to [facility] in Sept 2022.  Peripheral blood ct-DNA testing from Sept 2022 was negative for any resistance mutations and just revealed her known EGFR mutation.   Began 2nd line Carbo/Pemetrexed in September 2022 with overall stable disease.  Completed 4 cycles of platinum doublet chemotherapy in mid November 2022 followed by pemetrexed maintenance as of early December 2022.  Developed disease progression in October 2024.  Therapy with amivantamab deferred due to concerns about ability to work throughout treatment. She started third-line gemcitabine on 11/7/24; achieved nice [place].  Right Mediport placed May 2025.  [de-identified] : [facility] ct-DNA 9/7/22 with EGFR exon 19 deletion, [id number];APC.  [de-identified] : Patient started third-line gemcitabine on 11/7/24 (3 weeks on, 1 week off); achieved [place]. Restaging PET/CT 3/19/2025 with deeper response. Right-sided Mediport was placed on 5/30/2025. Patient presents prior to C10 D8 of gemcitabine. She was found with left popliteal vein DVT extending to left calf veins on venous doppler studies 7/20/25; requested due to L>R leg swelling She went to the hospital due to shortness of breath and CT Angio chest 7/20/25 revealed bilateral pulmonary emboli. Received IV heparin and was transitioned to Eliquis. Patient reports left leg swelling has improved and breathing is now comfortable.  Has no complaints. Denies F/C/N/V, rash, myalgias, diarrhea, constipation, or bleeding.   Restaging PET/CT with overall sustained response; there is note of nonspecific right lung opacities that may be inflammatory.